# Patient Record
Sex: FEMALE | Race: WHITE | NOT HISPANIC OR LATINO | Employment: OTHER | URBAN - METROPOLITAN AREA
[De-identification: names, ages, dates, MRNs, and addresses within clinical notes are randomized per-mention and may not be internally consistent; named-entity substitution may affect disease eponyms.]

---

## 2018-09-27 ENCOUNTER — HISTORICAL (OUTPATIENT)
Dept: ADMINISTRATIVE | Facility: HOSPITAL | Age: 73
End: 2018-09-27

## 2019-01-30 ENCOUNTER — HISTORICAL (OUTPATIENT)
Dept: ADMINISTRATIVE | Facility: HOSPITAL | Age: 74
End: 2019-01-30

## 2019-01-30 LAB
ABS NEUT (OLG): 5.8 X10(3)/MCL (ref 2.1–9.2)
ALBUMIN SERPL-MCNC: 4.8 GM/DL (ref 3.4–5)
ALBUMIN/GLOB SERPL: 1.66 {RATIO} (ref 1.5–2.5)
ALP SERPL-CCNC: 87 UNIT/L (ref 38–126)
ALT SERPL-CCNC: 22 UNIT/L (ref 7–52)
AST SERPL-CCNC: 26 UNIT/L (ref 15–37)
BILIRUB SERPL-MCNC: 0.5 MG/DL (ref 0.2–1)
BILIRUBIN DIRECT+TOT PNL SERPL-MCNC: 0.1 MG/DL (ref 0–0.5)
BILIRUBIN DIRECT+TOT PNL SERPL-MCNC: 0.4 MG/DL
BUN SERPL-MCNC: 12 MG/DL (ref 7–18)
CALCIUM SERPL-MCNC: 9.7 MG/DL (ref 8.5–10)
CHLORIDE SERPL-SCNC: 102 MMOL/L (ref 98–107)
CHOLEST SERPL-MCNC: 249 MG/DL (ref 0–200)
CHOLEST/HDLC SERPL: 4.2 {RATIO}
CO2 SERPL-SCNC: 30 MMOL/L (ref 21–32)
CREAT SERPL-MCNC: 0.72 MG/DL (ref 0.6–1.3)
ERYTHROCYTE [DISTWIDTH] IN BLOOD BY AUTOMATED COUNT: 12.9 % (ref 11.5–17)
GLOBULIN SER-MCNC: 2.9 GM/DL (ref 1.2–3)
GLUCOSE SERPL-MCNC: 113 MG/DL (ref 74–106)
HCT VFR BLD AUTO: 45.3 % (ref 37–47)
HDLC SERPL-MCNC: 60 MG/DL (ref 35–60)
HGB BLD-MCNC: 14.6 GM/DL (ref 12–16)
LDLC SERPL CALC-MCNC: 150 MG/DL (ref 0–129)
LYMPHOCYTES # BLD AUTO: 1.8 X10(3)/MCL (ref 0.6–3.4)
LYMPHOCYTES NFR BLD AUTO: 21.6 % (ref 13–40)
MCH RBC QN AUTO: 30.1 PG (ref 27–31.2)
MCHC RBC AUTO-ENTMCNC: 32 GM/DL (ref 32–36)
MCV RBC AUTO: 93 FL (ref 80–94)
MONOCYTES # BLD AUTO: 0.7 X10(3)/MCL (ref 0.1–1.3)
MONOCYTES NFR BLD AUTO: 7.9 % (ref 0.1–24)
NEUTROPHILS NFR BLD AUTO: 70.5 % (ref 47–80)
PLATELET # BLD AUTO: 284 X10(3)/MCL (ref 130–400)
PMV BLD AUTO: 11 FL (ref 9.4–12.4)
POTASSIUM SERPL-SCNC: 4 MMOL/L (ref 3.5–5.1)
PROT SERPL-MCNC: 7.7 GM/DL (ref 6.4–8.2)
RBC # BLD AUTO: 4.85 X10(6)/MCL (ref 4.2–5.4)
SODIUM SERPL-SCNC: 139 MMOL/L (ref 136–145)
TRIGL SERPL-MCNC: 152 MG/DL (ref 30–150)
TSH SERPL-ACNC: 1.17 MIU/ML (ref 0.35–4.94)
VLDLC SERPL CALC-MCNC: 30.4 MG/DL
WBC # SPEC AUTO: 8.3 X10(3)/MCL (ref 4.5–11.5)

## 2019-04-15 ENCOUNTER — HISTORICAL (OUTPATIENT)
Dept: ADMINISTRATIVE | Facility: HOSPITAL | Age: 74
End: 2019-04-15

## 2019-04-15 LAB
ABS NEUT (OLG): 4.4 X10(3)/MCL (ref 2.1–9.2)
ALBUMIN SERPL-MCNC: 4.6 GM/DL (ref 3.4–5)
ALBUMIN/GLOB SERPL: 1.7 {RATIO} (ref 1.5–2.5)
ALP SERPL-CCNC: 78 UNIT/L (ref 38–126)
ALT SERPL-CCNC: 14 UNIT/L (ref 7–52)
AST SERPL-CCNC: 20 UNIT/L (ref 15–37)
BILIRUB SERPL-MCNC: 0.5 MG/DL (ref 0.2–1)
BILIRUBIN DIRECT+TOT PNL SERPL-MCNC: 0.1 MG/DL (ref 0–0.5)
BILIRUBIN DIRECT+TOT PNL SERPL-MCNC: 0.4 MG/DL
BUN SERPL-MCNC: 10 MG/DL (ref 7–18)
CALCIUM SERPL-MCNC: 9.3 MG/DL (ref 8.5–10)
CHLORIDE SERPL-SCNC: 100 MMOL/L (ref 98–107)
CHOLEST SERPL-MCNC: 224 MG/DL (ref 0–200)
CHOLEST/HDLC SERPL: 4.8 {RATIO}
CO2 SERPL-SCNC: 30 MMOL/L (ref 21–32)
CREAT SERPL-MCNC: 0.82 MG/DL (ref 0.6–1.3)
ERYTHROCYTE [DISTWIDTH] IN BLOOD BY AUTOMATED COUNT: 12.8 % (ref 11.5–17)
GLOBULIN SER-MCNC: 2.7 GM/DL (ref 1.2–3)
GLUCOSE SERPL-MCNC: 105 MG/DL (ref 74–106)
HCT VFR BLD AUTO: 41.4 % (ref 37–47)
HDLC SERPL-MCNC: 47 MG/DL (ref 35–60)
HGB BLD-MCNC: 14 GM/DL (ref 12–16)
LDLC SERPL CALC-MCNC: 117 MG/DL (ref 0–129)
LYMPHOCYTES # BLD AUTO: 1.8 X10(3)/MCL (ref 0.6–3.4)
LYMPHOCYTES NFR BLD AUTO: 27.6 % (ref 13–40)
MCH RBC QN AUTO: 30.2 PG (ref 27–31.2)
MCHC RBC AUTO-ENTMCNC: 34 GM/DL (ref 32–36)
MCV RBC AUTO: 89 FL (ref 80–94)
MONOCYTES # BLD AUTO: 0.4 X10(3)/MCL (ref 0.1–1.3)
MONOCYTES NFR BLD AUTO: 6.6 % (ref 0.1–24)
NEUTROPHILS NFR BLD AUTO: 65.8 % (ref 47–80)
PLATELET # BLD AUTO: 259 X10(3)/MCL (ref 130–400)
PMV BLD AUTO: 10.9 FL (ref 9.4–12.4)
POTASSIUM SERPL-SCNC: 4.2 MMOL/L (ref 3.5–5.1)
PROT SERPL-MCNC: 7.3 GM/DL (ref 6.4–8.2)
RBC # BLD AUTO: 4.64 X10(6)/MCL (ref 4.2–5.4)
SODIUM SERPL-SCNC: 137 MMOL/L (ref 136–145)
TRIGL SERPL-MCNC: 279 MG/DL (ref 30–150)
VLDLC SERPL CALC-MCNC: 55.8 MG/DL
WBC # SPEC AUTO: 6.6 X10(3)/MCL (ref 4.5–11.5)

## 2019-05-01 ENCOUNTER — HISTORICAL (OUTPATIENT)
Dept: ADMINISTRATIVE | Facility: HOSPITAL | Age: 74
End: 2019-05-01

## 2019-06-27 ENCOUNTER — HISTORICAL (OUTPATIENT)
Dept: LAB | Facility: HOSPITAL | Age: 74
End: 2019-06-27

## 2019-06-27 ENCOUNTER — HISTORICAL (OUTPATIENT)
Dept: ADMINISTRATIVE | Facility: HOSPITAL | Age: 74
End: 2019-06-27

## 2019-06-27 LAB
ABS NEUT (OLG): 4.5 X10(3)/MCL (ref 2.1–9.2)
ERYTHROCYTE [DISTWIDTH] IN BLOOD BY AUTOMATED COUNT: 12.9 % (ref 11.5–17)
FERRITIN SERPL-MCNC: 18.6 NG/ML (ref 8–388)
HCT VFR BLD AUTO: 42.2 % (ref 37–47)
HGB BLD-MCNC: 14.2 GM/DL (ref 12–16)
IRON SATN MFR SERPL: 24.4 % (ref 20–50)
IRON SERPL-MCNC: 109 MCG/DL (ref 50–175)
LYMPHOCYTES # BLD AUTO: 2 X10(3)/MCL (ref 0.6–3.4)
LYMPHOCYTES NFR BLD AUTO: 28.9 % (ref 13–40)
MCH RBC QN AUTO: 29.9 PG (ref 27–31.2)
MCHC RBC AUTO-ENTMCNC: 34 GM/DL (ref 32–36)
MCV RBC AUTO: 89 FL (ref 80–94)
MONOCYTES # BLD AUTO: 0.5 X10(3)/MCL (ref 0.1–1.3)
MONOCYTES NFR BLD AUTO: 6.6 % (ref 0.1–24)
NEUTROPHILS NFR BLD AUTO: 64.5 % (ref 47–80)
PLATELET # BLD AUTO: 259 X10(3)/MCL (ref 130–400)
PMV BLD AUTO: 11 FL (ref 9.4–12.4)
RBC # BLD AUTO: 4.75 X10(6)/MCL (ref 4.2–5.4)
TIBC SERPL-MCNC: 447 MCG/DL (ref 250–450)
TRANSFERRIN SERPL-MCNC: 353 MG/DL (ref 200–360)
WBC # SPEC AUTO: 7 X10(3)/MCL (ref 4.5–11.5)

## 2019-10-22 ENCOUNTER — HISTORICAL (OUTPATIENT)
Dept: LAB | Facility: HOSPITAL | Age: 74
End: 2019-10-22

## 2019-10-22 LAB
FERRITIN SERPL-MCNC: 36.5 NG/ML (ref 8–388)
IRON SATN MFR SERPL: 23.8 % (ref 20–50)
IRON SERPL-MCNC: 95 MCG/DL (ref 50–175)
TIBC SERPL-MCNC: 400 MCG/DL (ref 250–450)
TRANSFERRIN SERPL-MCNC: 308 MG/DL (ref 200–360)

## 2019-10-30 ENCOUNTER — HISTORICAL (OUTPATIENT)
Dept: ADMINISTRATIVE | Facility: HOSPITAL | Age: 74
End: 2019-10-30

## 2019-10-30 LAB
ALBUMIN SERPL-MCNC: 4.5 GM/DL (ref 3.4–5)
ALBUMIN/GLOB SERPL: 1.8 {RATIO} (ref 1.5–2.5)
ALP SERPL-CCNC: 96 UNIT/L (ref 38–126)
ALT SERPL-CCNC: 16 UNIT/L (ref 7–52)
AST SERPL-CCNC: 20 UNIT/L (ref 15–37)
BILIRUB SERPL-MCNC: 0.5 MG/DL (ref 0.2–1)
BILIRUBIN DIRECT+TOT PNL SERPL-MCNC: 0.1 MG/DL (ref 0–0.5)
BILIRUBIN DIRECT+TOT PNL SERPL-MCNC: 0.4 MG/DL
BUN SERPL-MCNC: 10 MG/DL (ref 7–18)
CALCIUM SERPL-MCNC: 9.6 MG/DL (ref 8.5–10)
CHLORIDE SERPL-SCNC: 100 MMOL/L (ref 98–107)
CHOLEST SERPL-MCNC: 236 MG/DL (ref 0–200)
CHOLEST/HDLC SERPL: 4.7 {RATIO}
CO2 SERPL-SCNC: 32 MMOL/L (ref 21–32)
CREAT SERPL-MCNC: 0.79 MG/DL (ref 0.6–1.3)
GLOBULIN SER-MCNC: 2.5 GM/DL (ref 1.2–3)
GLUCOSE SERPL-MCNC: 102 MG/DL (ref 74–106)
HDLC SERPL-MCNC: 50 MG/DL (ref 35–60)
LDLC SERPL CALC-MCNC: 145 MG/DL (ref 0–129)
POTASSIUM SERPL-SCNC: 4.3 MMOL/L (ref 3.5–5.1)
PROT SERPL-MCNC: 7 GM/DL (ref 6.4–8.2)
SODIUM SERPL-SCNC: 139 MMOL/L (ref 136–145)
TRIGL SERPL-MCNC: 171 MG/DL (ref 30–150)
VLDLC SERPL CALC-MCNC: 34.2 MG/DL

## 2020-07-14 ENCOUNTER — HISTORICAL (OUTPATIENT)
Dept: ADMINISTRATIVE | Facility: HOSPITAL | Age: 75
End: 2020-07-14

## 2020-07-14 LAB
ABS NEUT (OLG): 3.9 X10(3)/MCL (ref 2.1–9.2)
ALBUMIN SERPL-MCNC: 4.5 GM/DL (ref 3.4–5)
ALBUMIN/GLOB SERPL: 1.88 {RATIO} (ref 1.5–2.5)
ALP SERPL-CCNC: 88 UNIT/L (ref 38–126)
ALT SERPL-CCNC: 13 UNIT/L (ref 7–52)
AST SERPL-CCNC: 18 UNIT/L (ref 15–37)
BILIRUB SERPL-MCNC: 0.5 MG/DL (ref 0.2–1)
BILIRUBIN DIRECT+TOT PNL SERPL-MCNC: 0.1 MG/DL (ref 0–0.5)
BILIRUBIN DIRECT+TOT PNL SERPL-MCNC: 0.4 MG/DL
BUN SERPL-MCNC: 14 MG/DL (ref 7–18)
CALCIUM SERPL-MCNC: 9.7 MG/DL (ref 8.5–10)
CHLORIDE SERPL-SCNC: 100 MMOL/L (ref 98–107)
CHOLEST SERPL-MCNC: 230 MG/DL (ref 0–200)
CHOLEST/HDLC SERPL: 5.1 {RATIO}
CO2 SERPL-SCNC: 30 MMOL/L (ref 21–32)
CREAT SERPL-MCNC: 0.77 MG/DL (ref 0.6–1.3)
ERYTHROCYTE [DISTWIDTH] IN BLOOD BY AUTOMATED COUNT: 12.6 % (ref 11.5–17)
GLOBULIN SER-MCNC: 2.4 GM/DL (ref 1.2–3)
GLUCOSE SERPL-MCNC: 93 MG/DL (ref 74–106)
HCT VFR BLD AUTO: 42.5 % (ref 37–47)
HDLC SERPL-MCNC: 45 MG/DL (ref 35–60)
HGB BLD-MCNC: 13.7 GM/DL (ref 12–16)
LDLC SERPL CALC-MCNC: 142 MG/DL (ref 0–129)
LYMPHOCYTES # BLD AUTO: 1.7 X10(3)/MCL (ref 0.6–3.4)
LYMPHOCYTES NFR BLD AUTO: 28.6 % (ref 13–40)
MCH RBC QN AUTO: 29.1 PG (ref 27–31.2)
MCHC RBC AUTO-ENTMCNC: 32 GM/DL (ref 32–36)
MCV RBC AUTO: 90 FL (ref 80–94)
MONOCYTES # BLD AUTO: 0.5 X10(3)/MCL (ref 0.1–1.3)
MONOCYTES NFR BLD AUTO: 8.7 % (ref 0.1–24)
NEUTROPHILS NFR BLD AUTO: 62.7 % (ref 47–80)
PLATELET # BLD AUTO: 244 X10(3)/MCL (ref 130–400)
PMV BLD AUTO: 11 FL (ref 9.4–12.4)
POTASSIUM SERPL-SCNC: 4.1 MMOL/L (ref 3.5–5.1)
PROT SERPL-MCNC: 6.9 GM/DL (ref 6.4–8.2)
RBC # BLD AUTO: 4.7 X10(6)/MCL (ref 4.2–5.4)
SODIUM SERPL-SCNC: 139 MMOL/L (ref 136–145)
TRIGL SERPL-MCNC: 174 MG/DL (ref 30–150)
TSH SERPL-ACNC: 2.34 MIU/ML (ref 0.35–4.94)
VLDLC SERPL CALC-MCNC: 34.8 MG/DL
WBC # SPEC AUTO: 6.1 X10(3)/MCL (ref 4.5–11.5)

## 2021-01-14 ENCOUNTER — HISTORICAL (OUTPATIENT)
Dept: ADMINISTRATIVE | Facility: HOSPITAL | Age: 76
End: 2021-01-14

## 2021-01-14 LAB
ABS NEUT (OLG): 4.5 X10(3)/MCL (ref 2.1–9.2)
ALBUMIN SERPL-MCNC: 4.3 GM/DL (ref 3.4–5)
ALBUMIN/GLOB SERPL: 1.79 {RATIO} (ref 1.5–2.5)
ALP SERPL-CCNC: 94 UNIT/L (ref 38–126)
ALT SERPL-CCNC: 12 UNIT/L (ref 7–52)
AST SERPL-CCNC: 16 UNIT/L (ref 15–37)
BILIRUB SERPL-MCNC: 0.5 MG/DL (ref 0.2–1)
BILIRUBIN DIRECT+TOT PNL SERPL-MCNC: 0.1 MG/DL (ref 0–0.5)
BILIRUBIN DIRECT+TOT PNL SERPL-MCNC: 0.4 MG/DL
BUN SERPL-MCNC: 14 MG/DL (ref 7–18)
CALCIUM SERPL-MCNC: 9.7 MG/DL (ref 8.5–10)
CHLORIDE SERPL-SCNC: 99 MMOL/L (ref 98–107)
CHOLEST SERPL-MCNC: 239 MG/DL (ref 0–200)
CHOLEST/HDLC SERPL: 5.4 {RATIO}
CO2 SERPL-SCNC: 31 MMOL/L (ref 21–32)
CREAT SERPL-MCNC: 0.87 MG/DL (ref 0.6–1.3)
ERYTHROCYTE [DISTWIDTH] IN BLOOD BY AUTOMATED COUNT: 12.4 % (ref 11.5–17)
GLOBULIN SER-MCNC: 2.4 GM/DL (ref 1.2–3)
GLUCOSE SERPL-MCNC: 99 MG/DL (ref 74–106)
HCT VFR BLD AUTO: 42.2 % (ref 37–47)
HDLC SERPL-MCNC: 44 MG/DL (ref 35–60)
HGB BLD-MCNC: 13.9 GM/DL (ref 12–16)
LDLC SERPL CALC-MCNC: 165 MG/DL (ref 0–129)
LYMPHOCYTES # BLD AUTO: 1.8 X10(3)/MCL (ref 0.6–3.4)
LYMPHOCYTES NFR BLD AUTO: 26 % (ref 13–40)
MCH RBC QN AUTO: 29.8 PG (ref 27–31.2)
MCHC RBC AUTO-ENTMCNC: 33 GM/DL (ref 32–36)
MCV RBC AUTO: 91 FL (ref 80–94)
MONOCYTES # BLD AUTO: 0.5 X10(3)/MCL (ref 0.1–1.3)
MONOCYTES NFR BLD AUTO: 7.4 % (ref 0.1–24)
NEUTROPHILS NFR BLD AUTO: 66.6 % (ref 47–80)
PLATELET # BLD AUTO: 266 X10(3)/MCL (ref 130–400)
PMV BLD AUTO: 10.5 FL (ref 9.4–12.4)
POTASSIUM SERPL-SCNC: 3.9 MMOL/L (ref 3.5–5.1)
PROT SERPL-MCNC: 6.7 GM/DL (ref 6.4–8.2)
RBC # BLD AUTO: 4.66 X10(6)/MCL (ref 4.2–5.4)
SODIUM SERPL-SCNC: 138 MMOL/L (ref 136–145)
TRIGL SERPL-MCNC: 203 MG/DL (ref 30–150)
VLDLC SERPL CALC-MCNC: 40.6 MG/DL
WBC # SPEC AUTO: 6.8 X10(3)/MCL (ref 4.5–11.5)

## 2021-09-16 ENCOUNTER — HISTORICAL (OUTPATIENT)
Dept: ADMINISTRATIVE | Facility: HOSPITAL | Age: 76
End: 2021-09-16

## 2021-09-16 LAB
ABS NEUT (OLG): 5.5 X10(3)/MCL (ref 2.1–9.2)
ALBUMIN SERPL-MCNC: 4.6 GM/DL (ref 3.4–5)
ALBUMIN/GLOB SERPL: 1.84 {RATIO} (ref 1.5–2.5)
ALP SERPL-CCNC: 83 UNIT/L (ref 38–126)
ALT SERPL-CCNC: 16 UNIT/L (ref 7–52)
AST SERPL-CCNC: 21 UNIT/L (ref 15–37)
BILIRUB SERPL-MCNC: 0.5 MG/DL (ref 0.2–1)
BILIRUBIN DIRECT+TOT PNL SERPL-MCNC: 0.1 MG/DL (ref 0–0.5)
BILIRUBIN DIRECT+TOT PNL SERPL-MCNC: 0.4 MG/DL
BUN SERPL-MCNC: 14 MG/DL (ref 7–18)
CALCIUM SERPL-MCNC: 9.8 MG/DL (ref 8.5–10)
CHLORIDE SERPL-SCNC: 98 MMOL/L (ref 98–107)
CHOLEST SERPL-MCNC: 233 MG/DL (ref 0–200)
CHOLEST/HDLC SERPL: 5 {RATIO}
CO2 SERPL-SCNC: 29 MMOL/L (ref 21–32)
CREAT SERPL-MCNC: 0.81 MG/DL (ref 0.6–1.3)
DEPRECATED CALCIDIOL+CALCIFEROL SERPL-MC: 32 NG/ML (ref 30–80)
ERYTHROCYTE [DISTWIDTH] IN BLOOD BY AUTOMATED COUNT: 12.5 % (ref 11.5–17)
EST CREAT CLEARANCE SER (OHS): 66.69 ML/MIN
GLOBULIN SER-MCNC: 2.5 GM/DL (ref 1.2–3)
GLUCOSE SERPL-MCNC: 93 MG/DL (ref 74–106)
HCT VFR BLD AUTO: 44.8 % (ref 37–47)
HDLC SERPL-MCNC: 47 MG/DL (ref 35–60)
HGB BLD-MCNC: 14.2 GM/DL (ref 12–16)
LDLC SERPL CALC-MCNC: 141 MG/DL (ref 0–129)
LYMPHOCYTES # BLD AUTO: 2.1 X10(3)/MCL (ref 0.6–3.4)
LYMPHOCYTES NFR BLD AUTO: 26 % (ref 13–40)
MCH RBC QN AUTO: 29.3 PG (ref 27–31.2)
MCHC RBC AUTO-ENTMCNC: 32 GM/DL (ref 32–36)
MCV RBC AUTO: 93 FL (ref 80–94)
MONOCYTES # BLD AUTO: 0.5 X10(3)/MCL (ref 0.1–1.3)
MONOCYTES NFR BLD AUTO: 6.1 % (ref 0.1–24)
NEUTROPHILS NFR BLD AUTO: 67.9 % (ref 47–80)
PLATELET # BLD AUTO: 287 X10(3)/MCL (ref 130–400)
PMV BLD AUTO: 11 FL (ref 9.4–12.4)
POTASSIUM SERPL-SCNC: 4.3 MMOL/L (ref 3.5–5.1)
PROT SERPL-MCNC: 7.1 GM/DL (ref 6.4–8.2)
RBC # BLD AUTO: 4.84 X10(6)/MCL (ref 4.2–5.4)
SODIUM SERPL-SCNC: 138 MMOL/L (ref 136–145)
TRIGL SERPL-MCNC: 175 MG/DL (ref 30–150)
TSH SERPL-ACNC: 2.24 MIU/ML (ref 0.35–4.94)
VLDLC SERPL CALC-MCNC: 35 MG/DL
WBC # SPEC AUTO: 8.1 X10(3)/MCL (ref 4.5–11.5)

## 2022-04-10 ENCOUNTER — HISTORICAL (OUTPATIENT)
Dept: ADMINISTRATIVE | Facility: HOSPITAL | Age: 77
End: 2022-04-10

## 2022-04-26 VITALS
SYSTOLIC BLOOD PRESSURE: 142 MMHG | WEIGHT: 157.63 LBS | HEIGHT: 59 IN | DIASTOLIC BLOOD PRESSURE: 72 MMHG | BODY MASS INDEX: 31.78 KG/M2

## 2022-05-02 NOTE — HISTORICAL [facility] CERNER
This is a historical note converted from Toshia. Formatting and pictures may have been removed.  Please reference Toshia for original formatting and attached multimedia. Chief Complaint  WELLNESS CPX FAST, REFILL MEDS, DISCUSS ISSUES WITH , LEFT COLLAR BONE MOVES AROUND AND PAINFUL WITH CERTAIN MOVEMENTS  History of Present Illness  Patient presents today for annual wellness exam.? She has a few complaints including #1?pain to her left shoulder she states that it is right at the end of the collarbone right in the joint space, she is?doing more activity?and was pushing down to get up out of the chair when she noticed?kind of a pull or strain in that area?and has been bothering her off and on since this is been about 3 weeks,?she has been taking over-the-counter pain relievers?as needed but nothing consistently she says it helps temporarily.? #2 she states that?she may be getting depressed that she becomes tearful she is concerned that her 2 daughters do not talk to each other and seem to have had a falling out, this coupled with the fact that her? is beginning to show signs of dementia?we try to get him to see a neurologist?sometime ago but he refused at this time although he is on Aricept?which she said did help a little?but she says now she thinks she can convince him?to see a specialist. ?She can imagine this seems to?cause her much distress that she does not know?what can happen further on down the road as the age, and his anger?and attitude toward her she feels that has changed?as his dementia has worsened.  Review of Systems  Constitutional:?No fever, no weakness, no weight loss, no fatigue  Eye: ? ? ? ? ? ? ? ???No eye redness, drainage, or pain  ENMT:??? ? ? ? ? ? No sore?throat pain, postnasal drainage, ?or mucus production  Respiratory:????No wheezing,?no shortness of breath  Cardiovascular:??No chest pain, no VELA  Gastrointestinal:?No nausea, vomiting, or diarrhea. No abdominal pain, no  hematochezia or melena  Musculoskeletal:?No muscle or joint pain, no joint swelling  Integumentary:??? No skin rash or abnormal lesion  Neuro:??No headaches, dizziness, or weakness  Psych:?No anxiety, depression, or SI/HI  Endo:??No polyuria, polydipsia, or polyphagia  Heme/Lymph:??No bruising or lymphadenopathy  Physical Exam  Vitals & Measurements  T:?36.9? ?C (Oral)? HR:?92(Peripheral)? BP:?139/86?  HT:?154?cm? WT:?68.2?kg? BMI:?28.76?  General: ???? ? ? ? ? Well developed, well-nourished, in no acute distress  Eye: ? ? ? ? ? ? ? ? ? ??Clear conjunctiva, eyelids normal  HENT:??? ? ? ? ? ? ? ? No erythema, No exudate or swelling, TMs clear  Neck:??? ? ? ? ? ? ? ? ?Full range of motion, No cervical lymphadenopathy  Respiratory:??? ? ?Clear to auscultation bilaterally  Cardiovascular:?Regular rate and rhythm without murmurs, gallops or rubs  Abdomen: ? ? ? ? ?Soft, NT, ND, NABS x4, no HSM  Musculoskeletal:?No tenderness, joints WNL, FROM, neg SLR, no CCE  Neuro: ? ? ? ? ? ? ? ? ?No motor/sensory deficits, Reflexes 2+ throughout, CN II-XII intact  Integumentary: ??No rashes or skin lesions present  LN:?WNL  Assessment/Plan  1.?Medicare annual wellness visit, subsequent?Z00.00  ?Normal Exam  Ordered:  Automated Diff, Routine collect, 01/30/19 15:13:00 CST, Blood, Collected, Stop date 01/30/19 15:13:00 CST, Lab Collect, Medicare annual wellness visit, subsequent, 01/30/19 15:13:00 CST  CBC w/ Auto Diff, Routine collect, 01/30/19 15:13:00 CST, Blood, Stop date 01/30/19 15:13:00 CST, Lab Collect, Medicare annual wellness visit, subsequent, 01/30/19 15:13:00 CST  Comprehensive Metabolic Panel, Routine collect, 01/30/19 15:13:00 CST, Blood, Stop date 01/30/19 15:13:00 CST, Lab Collect, Medicare annual wellness visit, subsequent  Hyperlipemia, 01/30/19 15:13:00 CST  Lipid Panel, Now collect, 01/30/19 15:13:00 CST, Blood, Stop date 01/30/19 15:13:00 CST, Lab Collect, Medicare annual wellness visit, subsequent  Benign  essential HTN  Hyperlipemia, 01/30/19 15:13:00 CST  Preventative Health Care Est 65 yrs and over [address]896 PC, Medicare annual wellness visit, subsequent, [facility]LINK AMB - AFP, 01/30/19 14:40:00 CST  Thyroid Stimulating Hormone, Routine collect, 01/30/19 15:13:00 CST, Blood, Stop date 01/30/19 15:13:00 CST, Lab Collect, Medicare annual wellness visit, subsequent, 01/30/19 15:13:00 CST  ?  2.?Benign essential HTN?I10  ?stable  Ordered:  Clinic Follow up, *Est. 07/31/19 13:30:00 CDT, Order for future visit, Sprain of acromioclavicular joint, HLink AFP  Lipid Panel, Now collect, 01/30/19 15:13:00 CST, Blood, Stop date 01/30/19 15:13:00 CST, Lab Collect, Medicare annual wellness visit, subsequent  Benign essential HTN  Hyperlipemia, 01/30/19 15:13:00 CST  Office/Outpatient Visit Level 4 Established 96482 PC, Benign essential HTN  Osteoarthritis  Hyperlipemia  Sprain of acromioclavicular joint, [facility]LINK AMB - AFP, 01/30/19 14:40:00 CST  ?  3.?Osteoarthritis?M19.90  ?stable  Ordered:  Clinic Follow up, *Est. 07/31/19 13:30:00 CDT, Order for future visit, Sprain of acromioclavicular joint, HLink AFP  Office/Outpatient Visit Level 4 Established 80586 PC, Benign essential HTN  Osteoarthritis  Hyperlipemia  Sprain of acromioclavicular joint, [facility]LINK AMB - AFP, 01/30/19 14:40:00 CST  ?  4.?Hyperlipemia?E78.5  ?repeat labs today  Ordered:  Clinic Follow up, *Est. 07/31/19 13:30:00 CDT, Order for future visit, Sprain of acromioclavicular joint, HLink AFP  Comprehensive Metabolic Panel, Routine collect, 01/30/19 15:13:00 CST, Blood, Stop date 01/30/19 15:13:00 CST, Lab Collect, Medicare annual wellness visit, subsequent  Hyperlipemia, 01/30/19 15:13:00 CST  Lipid Panel, Now collect, 01/30/19 15:13:00 CST, Blood, Stop date 01/30/19 15:13:00 CST, Lab Collect, Medicare annual wellness visit, subsequent  Benign essential HTN  Hyperlipemia, 01/30/19 15:13:00 CST  Office/Outpatient Visit Level 4 Established 66286 PC, Benign essential HTN   Osteoarthritis  Hyperlipemia  Sprain of acromioclavicular joint, [facility]LINK AMB - AFP, 01/30/19 14:40:00 CST  ?  5.?Sprain of acromioclavicular joint?S43.50XA  ?NSAIDS, OTC creams  Ordered:  Clinic Follow up, *Est. 07/31/19 13:30:00 CDT, Order for future visit, Sprain of acromioclavicular joint, HLink AFP  Office/Outpatient Visit Level 4 Established 93898 PC, Benign essential HTN  Osteoarthritis  Hyperlipemia  Sprain of acromioclavicular joint, [facility] AMB - AFP, 01/30/19 14:40:00 CST  ?  6.?Depression?F32.9  ?Discussed risks versus benefits of SSRIs possible side effects.  We also talked about whether or not she feels safe with her ?although he becomes angered sometimes but then apologizes?due to his dementia.? She does state?that she think she will be okay, and we will continue to Take care of him?but is aware?of her need?to remain safe in the home environment?if something does happen or any changes occur as his condition worsens.  ?  Orders:  citalopram, 10 mg = 1 tab(s), Oral, Daily, # 30 tab(s), 1 Refill(s), Pharmacy: [facility] [id number] [facility]   Problem List/Past Medical History  Ongoing  Arthritis  Benign essential HTN  Bulging disc  Cataracts  Chronic back pain  Depression  Hyperlipemia  Insomnia  Medicare annual wellness visit, subsequent  Osteoarthritis  Osteoporosis  Presence of pessary  Rib fracture  Scoliosis  Sprain of acromioclavicular joint  Wears glasses  Historical  No qualifying data  Procedure/Surgical History  [facility] - Extracapsular cataract extraction (07/15/2013)  flat foot correction (1958)  tonsillectomy (1958)  back surgery  bilateral tubal ligation   Medications  amitriptyline 50 mg oral tablet, See Instructions, 1 refills  aspirin 81 mg oral tablet, CHEWABLE, Oral, Daily  Celexa 10 mg oral tablet, 10 mg= 1 tab(s), Oral, Daily, 1 refills  gabapentin 300 mg oral capsule, 300 mg= 1 cap(s), Oral, TID, 3 refills  hydrochlorothiazide 25 mg oral tablet, See Instructions, 3 refills  Emre Red  Omega-3 Krill Oil, 300 mg, Oral, Daily  meloxicam 15 mg oral tablet, See Instructions, 1 refills  Ocuvite oral tablet, Oral, Daily  traMADol 50 mg oral tablet, 50 mg= 1 tab(s), Oral, q6hr, PRN, 1 refills  Xyzal 5 mg oral tablet, 5 mg= 1 tab(s), Oral, qPM  Zaditor 0.025% ophthalmic solution, Oral, BID, PRN  Allergies  No Known Allergies  Social History  Alcohol - Denies Alcohol Use, 07/11/2013  Past, 03/09/2018  Employment/School  Retired, Highest education level: High school., 07/11/2013  Home/Environment  Lives with Spouse., 07/11/2013  Nutrition/Health  Regular, 07/21/2013  Substance Abuse - Denies Substance Abuse, 07/21/2013  Never, 01/30/2019  Tobacco - Denies Tobacco Use, 07/11/2013  Never (less than 100 in lifetime), N/A, 01/30/2019  Family History  CAD - Coronary artery disease: Sister.  Cancer: Mother and Father.  Rheumatoid arthritis: Brother.  Stroke: Brother.  Immunizations  Vaccine Date Status   pneumococcal 23-polyvalent vaccine 09/27/2018 Given   influenza virus vaccine, inactivated 09/05/2018 Recorded   pneumococcal 13-valent conjugate vaccine 03/29/2018 Recorded   influenza virus vaccine, inactivated 08/11/2017 Recorded   Health Maintenance  Health Maintenance  ???Pending?(in the next year)  ??? ??OverDue  ??? ? ? ?Pneumococcal Vaccine due??and every?  ??? ? ? ?Hypertension Management-BMP due??07/08/14??and every 1??year(s)  ??? ? ? ?Aspirin Therapy for CVD Prevention due??07/11/14??and every 1??year(s)  ??? ? ? ?Advance Directive due??07/21/14??and every 1??year(s)  ??? ? ? ?Smoking Cessation due??07/29/14??and every 1??year(s)  ??? ??Due?  ??? ? ? ?ADL Screening due??01/30/19??and every 1??year(s)  ??? ? ? ?Alcohol Misuse Screening due??01/30/19??and every 1??year(s)  ??? ? ? ?Breast Cancer Screening (Senior Wellness) due??01/30/19??and every?  ??? ? ? ?Cognitive Screening due??01/30/19??and every 1??year(s)  ??? ? ? ?Fall Risk Assessment due??01/30/19??and every 1??year(s)  ??? ? ? ?Functional  Assessment due??01/30/19??and every 1??year(s)  ??? ? ? ?Geriatric Depression Screening due??01/30/19??and every 1??year(s)  ??? ? ? ?Hypertension Management-Education due??01/30/19??and every 1??year(s)  ??? ? ? ?Tetanus Vaccine due??01/30/19??and every 10??year(s)  ??? ? ? ?Zoster Vaccine due??01/30/19??and every 100??year(s)  ???Satisfied?(in the past 1 year)  ??? ??Satisfied?  ??? ? ? ?Blood Pressure Screening on??01/30/19.??Satisfied by Viviana Lee LPN  ??? ? ? ?Body Mass Index Check on??01/30/19.??Satisfied by Viviana Lee LPN  ??? ? ? ?Depression Screening on??01/30/19.??Satisfied by Viviana Lee LPN  ??? ? ? ?Hypertension Management-Blood Pressure on??01/30/19.??Satisfied by Viviana Lee LPN  ??? ? ? ?Influenza Vaccine on??09/05/18.??Satisfied by Viviana Lee LPN  ??? ? ? ?Obesity Screening on??01/30/19.??Satisfied by Viviana Lee LPN  ??? ? ? ?Pneumococcal Vaccine on??09/27/18.??Satisfied by Viviana Lee LPN  ?  ?

## 2022-05-02 NOTE — HISTORICAL [facility] CERNER
This is a historical note converted from Toshia. Formatting and pictures may have been removed.  Please reference Toshia for original formatting and attached multimedia. Chief Complaint  6 MTH RC HTN, RC CHRONIC BACK PAIN, OA ?FAST  History of Present Illness  Follow up for HTN,? OA.? Pt doing well;  Pt is concerned about her memory; States that she is not as sharp as in the past.?  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  Independent in ADLs and IADLs.? Drives without difficulty; pays bills, sets up weekly meds for herself and her ;  ?  Has 3 children ([place], [place])  Review of Systems  Constitutional:?no fever, no fatigue, no weakness  Psych: no anxiety,?no?depression, no insomnia  Respiratory:?no cough, no wheezing, no shortness of breath  Cardiovascular:?no chest pain, no palpitations, no edema  Gastrointestinal:?no abdominal pain, no nausea, vomiting, diarrhea or constipation  Hema/Lymph:?no abnormal bruising or bleeding  Endocrine:?no heat or cold intolerance, no excessive thirst or excessive urination  Integumentary:?no skin rash or abnormal lesion  Neurologic: no headache, no dizziness, no weakness or numbness  Physical Exam  Vitals & Measurements  T:?36.7? ?C (Oral)? HR:?64(Peripheral)? BP:?124/72?  HT:?149.00?cm? HT:?149?cm? WT:?70.100?kg? WT:?70.1?kg? BMI:?31.58?  General:?well-developed well-nourished in no acute distress  Neck: no thyromegaly, no?lymphadenopathy, no carotid bruits  Respiratory:?respirations even and unlabored, clear to auscultation bilaterally  Cardiovascular:?regular rate and rhythm without murmurs, gallops or rubs  Musculoskeletal:?full range of motion of all extremities/spine  Integumentary: no rashes or skin lesions present  Neurologic: grossly intact  Assessment/Plan  1.?HTN (hypertension)?I10  ?Continue HCTZ 25mg/day  CMP today  Ordered:  Clinic Follow up, *Est. 07/14/21 10:00:00 CDT, Order for future visit, HLD (hyperlipidemia), HLink AFP  Comprehensive  Metabolic Panel, Routine collect, 01/14/21 10:53:00 CST, Blood, Stop date 01/14/21 10:53:00 CST, Lab Collect, HTN (hypertension), 01/14/21 10:53:00 CST  Office/Outpatient Visit Level 3 Established 11827 PC, HTN (hypertension)  HLD (hyperlipidemia)  Osteoarthritis, HLINK AMB - AFP, 01/14/21 11:10:00 CST  ?  2.?Osteoarthritis?M19.90  ?Continue tramadol, meloxicam  Ordered:  Clinic Follow up, *Est. 07/14/21 10:00:00 CDT, Order for future visit, HLD (hyperlipidemia), HLink AFP  Office/Outpatient Visit Level 3 Established 75075 PC, HTN (hypertension)  HLD (hyperlipidemia)  Osteoarthritis, [facility]LINK AMB - AFP, 01/14/21 11:10:00 CST  ?  3.?Hyperlipemia?E78.5  ?diet controlled; FLP today  Ordered:  Clinic Follow up, *Est. 07/14/21 10:00:00 CDT, Order for future visit, HLD (hyperlipidemia), HLink AFP  Lipid Panel, Routine collect, 01/14/21 10:53:00 CST, Blood, Stop date 01/14/21 10:53:00 CST, Lab Collect, HLD (hyperlipidemia), 01/14/21 10:53:00 CST  Office/Outpatient Visit Level 3 Established 77361 PC, HTN (hypertension)  HLD (hyperlipidemia)  Osteoarthritis, [facility]LINK AMB - AFP, 01/14/21 11:10:00 CST  ?  Follow up in 6 months for annual wellness  Referrals  Clinic Follow up, *Est. 07/14/21 10:00:00 CDT, Order for future visit, HLD (hyperlipidemia), HLink AFP   Problem List/Past Medical History  Ongoing  Anxiety  Arthritis  Benign essential HTN  Bulging disc  Chronic back pain  Depression  GERD (gastroesophageal reflux disease)  Hyperlipemia  Insomnia  Medicare annual wellness visit, subsequent  Obesity  BARB on CPAP  Osteoarthritis  Osteoporosis  Presence of pessary  Rib fracture  Scoliosis  Sprain of acromioclavicular joint  Wears glasses  Historical  Cataracts  Procedure/Surgical History  Discission of secondary membrane [after cataract] (01/07/2015)  Discission of secondary membranous cataract (opacified posterior lens capsule and/or anterior hyaloid); laser surgery (eg, YAG laser) (1 or more stages).  (01/07/2015)  Colonoscopy (09/04/2013)  Extracapsular cataract removal with insertion of intraocular lens prosthesis (1 stage procedure), manual or mechanical technique (eg, irrigation and aspiration or phacoemulsification). (07/29/2013)  Insertion of intraocular lens prosthesis at time of cataract extraction, one-stage (07/29/2013)  Phacoemulsification and aspiration of cataract (07/29/2013)  [facility] - Extracapsular cataract extraction (07/15/2013)  Extracapsular cataract removal with insertion of intraocular lens prosthesis (1 stage procedure), manual or mechanical technique (eg, irrigation and aspiration or phacoemulsification). (07/15/2013)  Insertion of intraocular lens prosthesis at time of cataract extraction, one-stage (07/15/2013)  Phacoemulsification and aspiration of cataract (07/15/2013)  flat foot correction (1958)  tonsillectomy (1958)  back surgery  bilateral tubal ligation   Medications  amitriptyline 50 mg oral tablet, See Instructions  aspirin 81 mg oral tablet, CHEWABLE, 81 mg= 1 tab(s), Oral, Daily, 11 refills  citalopram 20 mg oral tablet, See Instructions  fluticasone 50 mcg/inh nasal spray, 1 spray(s), Both Nostrils, Daily  gabapentin 300 mg oral capsule, See Instructions  hydrochlorothiazide 25 mg oral tablet, See Instructions  Emre Red Omega-3 Krill Oil, 300 mg, Oral, Daily  meloxicam 15 mg oral tablet, See Instructions  Ocuvite oral tablet, Oral, Daily  omeprazole 40 mg oral DR capsule, 40 mg= 1 cap(s), Oral, Daily  Slow Release Iron 160 mg oral tablet, extended release, 160 mg= 1 tab(s), Oral, Daily  traMADol 50 mg oral tablet, 50 mg= 1 tab(s), Oral, q4hr, PRN, 1 refills  Xyzal 5 mg oral tablet, 5 mg= 1 tab(s), Oral, qPM  Zaditor 0.025% ophthalmic solution, Oral, BID, PRN  Allergies  No Known Allergies  Social History  Abuse/Neglect  No, 01/14/2021  Alcohol - Denies Alcohol Use, 07/11/2013  Past, 03/09/2018  Employment/School  Retired, Highest education level: High school.,  07/11/2013  Home/Environment  Lives with Spouse., 07/11/2013  Nutrition/Health  Regular, 07/21/2013  Substance Use - Denies Substance Abuse, 07/21/2013  Never, 01/30/2019  Tobacco - Denies Tobacco Use, 07/11/2013  Never (less than 100 in lifetime), N/A, 01/14/2021  Family History  CAD - Coronary artery disease: Sister.  Cancer: Mother and Father.  Rheumatoid arthritis: Brother.  Stroke: Brother.  Immunizations  Vaccine Date Status   influenza virus vaccine, inactivated 09/10/2020 Given   influenza virus vaccine, inactivated 09/19/2019 Recorded   pneumococcal 23-polyvalent vaccine 09/27/2018 Given   influenza virus vaccine, inactivated 09/05/2018 Recorded   pneumococcal 13-valent conjugate vaccine 03/29/2018 Recorded   influenza virus vaccine, inactivated 08/11/2017 Recorded   influenza virus vaccine, inactivated 09/30/2014 Recorded   influenza virus vaccine, inactivated 10/08/2010 Recorded   influenza virus vaccine, inactivated 11/21/2001 Recorded   Health Maintenance  Health Maintenance  ???Pending?(in the next year)  ??? ??OverDue  ??? ? ? ?Aspirin Therapy for CVD Prevention due??04/15/20??and every 1??year(s)  ??? ? ? ?Influenza Vaccine due??10/01/20??and every 1??day(s)  ??? ? ? ?Advance Directive due??01/02/21??and every 1??year(s)  ??? ? ? ?Cognitive Screening due??01/02/21??and every 1??year(s)  ??? ? ? ?Fall Risk Assessment due??01/02/21??and every 1??year(s)  ??? ? ? ?Functional Assessment due??01/02/21??and every 1??year(s)  ??? ??Due?  ??? ? ? ?Alcohol Misuse Screening due??01/02/21??and every 1??year(s)  ??? ? ? ?ADL Screening due??01/14/21??and every 1??year(s)  ??? ? ? ?Hypertension Management-Education due??01/14/21??and every 1??year(s)  ??? ? ? ?Tetanus Vaccine due??01/14/21??and every 10??year(s)  ??? ? ? ?Zoster Vaccine due??01/14/21??Unknown Frequency  ??? ??Due In Future?  ??? ? ? ?Hypertension Management-BMP not due until??07/14/21??and every 1??year(s)  ??? ? ? ?Medicare Annual Wellness Exam  not due until??07/14/21??and every 1??year(s)  ??? ? ? ?Obesity Screening not due until??01/01/22??and every 1??year(s)  ???Satisfied?(in the past 1 year)  ??? ??Satisfied?  ??? ? ? ?Blood Pressure Screening on??01/14/21.??Satisfied by Viviana Lee LPN  ??? ? ? ?Body Mass Index Check on??01/14/21.??Satisfied by Viviana Lee LPN  ??? ? ? ?Depression Screening on??01/14/21.??Satisfied by Viviana Lee LPN  ??? ? ? ?Diabetes Screening on??07/14/20.??Satisfied by Isrrael Navarro  ??? ? ? ?Hypertension Management-Blood Pressure on??01/14/21.??Satisfied by Viviana Lee LPN  ??? ? ? ?Hypertension Management-BMP on??07/14/20.??Satisfied by Isrrael Navarro  ??? ? ? ?Influenza Vaccine on??09/10/20.??Satisfied by Yoel RICARDO, YOLANDEP, Jesse  ??? ? ? ?Lipid Screening on??07/14/20.??Satisfied by Isrrael Navarro  ??? ? ? ?Medicare Annual Wellness Exam on??07/14/20.??Satisfied by Yoel RICARDO, FNP, Jesse  ??? ? ? ?Obesity Screening on??01/14/21.??Satisfied by Viviana Lee LPN  ?      Patient condition discussed?in detail with nurse practitioner.? Agree with plan of care?and follow-up.

## 2022-05-02 NOTE — HISTORICAL [facility] CERNER
This is a historical note converted from [employer]. Formatting and pictures may have been removed.  Please reference [employer] for original formatting and attached multimedia. Chief Complaint  NEEDS ORDER FOR DEXA SCAN, C/O SOB WITH EXCERTION, TIRED ALOT AND DOESNT WANT TO DO ANYTHING  History of Present Illness  Patients primary concern today is that she has been having some ongoing shortness of breath, the shortness of breath is specifically associated with exertion?as she is very active at home cooking cleaning?is working out in the yard is resolved with stopping for a moment in sitting trying to recover.? She has had no nausea, vomiting,?or headaches associated with this shortness of breath.? Is not associated with a cough?or allergy type symptoms.? She denies any chest pain?associated with this or arm or neck pain at this time.  Review of Systems  Constitutional:?No fever, no weakness, no weight loss, no fatigue  Eye: ? ? ? ? ? ? ? ???No eye redness, drainage, or pain  ENMT:??? ? ? ? ? ? No sore?throat pain, postnasal drainage, ?or mucus production  Respiratory:????No wheezing,?shortness of breath as above  Cardiovascular:??No chest pain, no VELA  Gastrointestinal:?No nausea, vomiting, or diarrhea. No abdominal pain, no hematochezia or melena  Musculoskeletal:?No muscle or joint pain, no joint swelling  Integumentary:??? No skin rash or abnormal lesion  Neuro:??No headaches, dizziness, or weakness  Psych:?No anxiety, depression, or SI/HI  Endo:??No polyuria, polydipsia, or polyphagia  Heme/Lymph:??No bruising or lymphadenopathy  Physical Exam  Vitals & Measurements  T:?36.8? ?C (Oral)? HR:?92(Peripheral)? BP:?168/94?  HT:?154?cm? WT:?71.6?kg? BMI:?30.19?  General: ???? ? ? ? ? Well developed, well-nourished, in no acute distress  Eye: ? ? ? ? ? ? ? ? ? ??Clear conjunctiva, eyelids normal  HENT:??? ? ? ? ? ? ? ? No erythema, No exudate or swelling, TMs clear  Neck:??? ? ? ? ? ? ? ? ?Full range of motion, No cervical  lymphadenopathy  Respiratory:??? ? ?Clear to auscultation bilaterally  Cardiovascular:?Regular rate and rhythm without murmurs, gallops or rubs  Abdomen: ? ? ? ? ?Soft, NT, ND, NABS x4, no HSM  Musculoskeletal:?No tenderness, joints WNL, FROM, neg SLR, no CCE  Neuro: ? ? ? ? ? ? ? ? ?No motor/sensory deficits, Reflexes 2+ throughout, CN II-XII intact  Integumentary: ??No rashes or skin lesions present  LN:?WNL  Assessment/Plan  1.?Benign essential HTN  ?Patient on blood pressure?is running in the 120s-130s over 80s patient is diligent with checking.  2.?SOB (shortness of breath)  ?This shortness of breath is associated with exertion.? Her O2 sat here was 96% on room air and remained relatively unchanged despite walking around the hallway.  But as this seems to be significant at home when she is a very active person?referral to cardiology?would be appropriate at this time.  3.?Hyperlipemia  ?Repeating labs will contact patient with results and forward to cardiology.   Problem List/Past Medical History  Ongoing  Arthritis  Benign essential HTN  Bulging disc  Cataracts  Chronic back pain  Depression  Hyperlipemia  Insomnia  Medicare annual wellness visit, subsequent  Obesity  Osteoarthritis  Osteoporosis  Presence of pessary  Rib fracture  Scoliosis  Sprain of acromioclavicular joint  Wears glasses  Historical  No qualifying data  Procedure/Surgical History  Discission of secondary membrane [after cataract] (01/07/2015)  Discission of secondary membranous cataract (opacified posterior lens capsule and/or anterior hyaloid); laser surgery (eg, YAG laser) (1 or more stages). (01/07/2015)  Extracapsular cataract removal with insertion of intraocular lens prosthesis (1 stage procedure), manual or mechanical technique (eg, irrigation and aspiration or phacoemulsification). (07/29/2013)  Insertion of intraocular lens prosthesis at time of cataract extraction, one-stage (07/29/2013)  Phacoemulsification and aspiration of  cataract (07/29/2013)  [facility] - Extracapsular cataract extraction (07/15/2013)  Extracapsular cataract removal with insertion of intraocular lens prosthesis (1 stage procedure), manual or mechanical technique (eg, irrigation and aspiration or phacoemulsification). (07/15/2013)  Insertion of intraocular lens prosthesis at time of cataract extraction, one-stage (07/15/2013)  Phacoemulsification and aspiration of cataract (07/15/2013)  flat foot correction (1958)  tonsillectomy (1958)  back surgery  bilateral tubal ligation   Medications  amitriptyline 50 mg oral tablet, See Instructions, 1 refills  aspirin 81 mg oral tablet, CHEWABLE, 81 mg= 1 tab(s), Oral, Daily, 11 refills  Celexa 10 mg oral tablet, 10 mg= 1 tab(s), Oral, Daily, 1 refills  gabapentin 300 mg oral capsule, 300 mg= 1 cap(s), Oral, TID, 3 refills  hydrochlorothiazide 25 mg oral tablet, See Instructions, 3 refills  Emre Red Omega-3 Krill Oil, 300 mg, Oral, Daily  meloxicam 15 mg oral tablet, See Instructions, 2 refills  Ocuvite oral tablet, Oral, Daily  omeprazole 20 mg oral DR capsule, 20 mg= 1 cap(s), Oral, Daily  traMADol 50 mg oral tablet, 50 mg= 1 tab(s), Oral, q6hr, PRN, 1 refills  Xyzal 5 mg oral tablet, 5 mg= 1 tab(s), Oral, qPM  Zaditor 0.025% ophthalmic solution, Oral, BID, PRN  Allergies  No Known Allergies  Social History  Alcohol - Denies Alcohol Use, 07/11/2013  Past, 03/09/2018  Employment/School  Retired, Highest education level: High school., 07/11/2013  Home/Environment  Lives with Spouse., 07/11/2013  Nutrition/Health  Regular, 07/21/2013  Substance Abuse - Denies Substance Abuse, 07/21/2013  Never, 01/30/2019  Tobacco - Denies Tobacco Use, 07/11/2013  Never (less than 100 in lifetime), N/A, 04/15/2019  Family History  CAD - Coronary artery disease: Sister.  Cancer: Mother and Father.  Rheumatoid arthritis: Brother.  Stroke: Brother.  Immunizations  Vaccine Date Status   pneumococcal 23-polyvalent vaccine 09/27/2018 Given   influenza  virus vaccine, inactivated 09/05/2018 Recorded   pneumococcal 13-valent conjugate vaccine 03/29/2018 Recorded   influenza virus vaccine, inactivated 08/11/2017 Recorded   Health Maintenance  Health Maintenance  ???Pending?(in the next year)  ??? ??OverDue  ??? ? ? ?Pneumococcal Vaccine due??and every?  ??? ? ? ?Advance Directive due??07/21/14??and every 1??year(s)  ??? ? ? ?Smoking Cessation due??07/29/14??and every 1??year(s)  ??? ??Due?  ??? ? ? ?ADL Screening due??04/16/19??and every 1??year(s)  ??? ? ? ?Alcohol Misuse Screening due??04/16/19??and every 1??year(s)  ??? ? ? ?Breast Cancer Screening (Senior Wellness) due??04/16/19??and every?  ??? ? ? ?Cognitive Screening due??04/16/19??and every 1??year(s)  ??? ? ? ?Fall Risk Assessment due??04/16/19??and every 1??year(s)  ??? ? ? ?Functional Assessment due??04/16/19??and every 1??year(s)  ??? ? ? ?Geriatric Depression Screening due??04/16/19??and every 1??year(s)  ??? ? ? ?Hypertension Management-Education due??04/16/19??and every 1??year(s)  ??? ? ? ?Tetanus Vaccine due??04/16/19??and every 10??year(s)  ??? ? ? ?Zoster Vaccine due??04/16/19??and every 100??year(s)  ??? ??Due In Future?  ??? ? ? ?Hypertension Management-Blood Pressure not due until??04/14/20??and every 1??year(s)  ??? ? ? ?Hypertension Management-BMP not due until??04/14/20??and every 1??year(s)  ??? ? ? ?Obesity Screening not due until??04/15/20??and every 1??year(s)  ??? ? ? ?Aspirin Therapy for CVD Prevention not due until??04/15/20??and every 1??year(s)  ???Satisfied?(in the past 1 year)  ??? ??Satisfied?  ??? ? ? ?Aspirin Therapy for CVD Prevention on??04/15/19.??Satisfied by Sam Earl MD  ??? ? ? ?Blood Pressure Screening on??04/15/19.??Satisfied by Viviana Lee LPN  ??? ? ? ?Body Mass Index Check on??04/15/19.??Satisfied by Viviana Lee LPN  ??? ? ? ?Depression Screening on??04/15/19.??Satisfied by Viviana Lee LPN  ??? ? ? ?Diabetes Screening on??04/15/19.??Satisfied by  Isrrael Navarro  ??? ? ? ?Hypertension Management-BMP on??04/15/19.??Satisfied by Isrrael Navarro  ??? ? ? ?Influenza Vaccine on??09/05/18.??Satisfied by Viviana Lee LPN  ??? ? ? ?Lipid Screening on??04/15/19.??Satisfied by Isrrael Navarro  ??? ? ? ?Obesity Screening on??04/15/19.??Satisfied by Viviana Lee LPN  ??? ? ? ?Pneumococcal Vaccine on??09/27/18.??Satisfied by Viviana Lee LPN  ?  ?

## 2022-05-02 NOTE — HISTORICAL [facility] CERNER
This is a historical note converted from [employer]. Formatting and pictures may have been removed.  Please reference [employer] for original formatting and attached multimedia. Chief Complaint  WELLNESS CPX FAST  History of Present Illness  74 year old WF presents fasting for annual wellness  PMH: HTN, Scoliosis, Chronic back pain, Insomnia, HLD, Osteoporosis  ?  , Retired  No Tob, No EtOH  No exercise  ?   with worsening dementia. He is functioning better since starting anxiety/depression med last year. His anger is much less which makes my life so much easier.  Review of Systems  Constitutional:?no fever, fatigue, weakness  Eye:?no vision loss, eye redness, drainage, or pain  ENMT:?no sore throat, ear pain, sinus pain/congestion  Respiratory:?no cough, no wheezing, no shortness of breath  Cardiovascular:?no chest pain, no palpitations, no edema  Gastrointestinal:?no nausea, vomiting, or diarrhea. No abdominal pain  Genitourinary:?no dysuria, no urinary frequency or urgency, no hematuria  Hema/Lymph:?no abnormal bruising or bleeding  Endocrine:?no heat or cold intolerance, no excessive thirst or excessive urination  Musculoskeletal:?no muscle or joint pain, no joint swelling  Integumentary:?no skin rash or abnormal lesion  Neurologic: no headache, no dizziness, no weakness or numbness  ?  Physical Exam  Vitals & Measurements  T:?36.8? ?C (Oral)? HR:?64(Peripheral)? BP:?140/84?  HT:?149?cm? WT:?71.4?kg? BMI:?32.16?  General:?well-developed well-nourished in no acute distress  Eye: PERRLA, EOMI, clear conjunctiva, eyelids normal  HENT:?TMs/ear canals clear, oropharynx without erythema/exudate, oropharynx and nasal mucosal surfaces moist, no maxillary/frontal sinus tenderness to palpation  Neck: full range of motion, no thyromegaly or lymphadenopathy  Respiratory:?clear to auscultation bilaterally  Cardiovascular:?regular rate and rhythm without murmurs, gallops or rubs  Gastrointestinal:?soft, non-tender,  non-distended with normal bowel sounds, without masses to palpation  Genitourinary: no CVA tenderness to palpation  Musculoskeletal:?full range of motion of all extremities/spine without limitation or discomfort  Integumentary: no rashes or skin lesions present  Neurologic: cranial nerves intact, no signs of peripheral neurological deficit, motor/sensory function intact  ?  Assessment/Plan  1.?Medicare annual wellness visit, subsequent?Z00.00  ?LABS: CBC, CMP, TSH, FLP  Ordered:  Medicare Annual Wellness- Subsequent  PC, Medicare annual wellness visit, subsequent, [facility] - [facility], 07/14/20 10:50:00 CDT  ?  2.?Benign essential HTN?I10  ?Continue HCTZ 25mg PO q day  Ordered:  Automated Diff, Routine collect, 07/14/20 10:42:00 CDT, Blood, Collected, Stop date 07/14/20 10:42:00 CDT, Lab Collect, Benign essential HTN, 07/14/20 10:42:00 CDT  CBC w/ Auto Diff, Routine collect, 07/14/20 10:42:00 CDT, Blood, Stop date 07/14/20 10:42:00 CDT, Lab Collect, Benign essential HTN, 07/14/20 10:42:00 CDT  Clinic Follow up, *Est. 01/14/21 3:00:00 CST, Order for future visit, Benign essential HTN, [facility]  Comprehensive Metabolic Panel, Routine collect, 07/14/20 10:42:00 CDT, Blood, Stop date 07/14/20 10:42:00 CDT, Lab Collect, Benign essential HTN  Hyperlipemia, 07/14/20 10:42:00 CDT  Office/Outpatient Visit Level 3 Established 62316 PC, Anxiety  Hyperlipemia  Osteoporosis  Scoliosis  Benign essential HTN, [facility] - [facility], 07/14/20 10:50:00 CDT  ?  3.?Anxiety?F41.9  Continue Celexa 20mg PO q day  Ordered:  Office/Outpatient Visit Level 3 Established 56320 PC, Anxiety  Hyperlipemia  Osteoporosis  Scoliosis  Benign essential HTN, [facility] - [facility], 07/14/20 10:50:00 CDT  Thyroid Stimulating Hormone, Routine collect, 07/14/20 10:42:00 CDT, Blood, Stop date 07/14/20 10:42:00 CDT, Lab Collect, Anxiety, 07/14/20 10:42:00 CDT  ?  4.?Hyperlipemia?E78.5  Ordered:  Comprehensive Metabolic Panel, Routine collect, 07/14/20 10:42:00  CDT, Blood, Stop date 07/14/20 10:42:00 CDT, Lab Collect, Benign essential HTN  Hyperlipemia, 07/14/20 10:42:00 CDT  Lipid Panel, Routine collect, 07/14/20 10:42:00 CDT, Blood, Stop date 07/14/20 10:42:00 CDT, Lab Collect, Hyperlipemia, 07/14/20 10:42:00 CDT  Office/Outpatient Visit Level 3 Established 18925 PC, Anxiety  Hyperlipemia  Osteoporosis  Scoliosis  Benign essential HTN, [facility] - [facility], 07/14/20 10:50:00 CDT  ?  5.?Osteoporosis?M81.0  Ordered:  Office/Outpatient Visit Level 3 Established 46075 PC, Anxiety  Hyperlipemia  Osteoporosis  Scoliosis  Benign essential HTN, [facility] - AFP, 07/14/20 10:50:00 CDT  ?  6.?Scoliosis?M41.9  ?with persistent back pain  ?Continue Tramadol 50mg PO TID PRN pain  Ordered:  Office/Outpatient Visit Level 3 Established 69006 PC, Anxiety  Hyperlipemia  Osteoporosis  Scoliosis  Benign essential HTN, [facility] - [facility], 07/14/20 10:50:00 CDT  ?  7.?Osteoarthritis?M19.90  ?Continue Meloxicam 15mg PO q day  ?  Referrals  Clinic Follow up, *Est. 01/14/21 3:00:00 CST, Order for future visit, Benign essential HTN, [facility]   Problem List/Past Medical History  Ongoing  Anxiety  Arthritis  Benign essential HTN  Bulging disc  Cataracts  Chronic back pain  Depression  Hyperlipemia  Insomnia  Medicare annual wellness visit, subsequent  Obesity  BARB on CPAP  Osteoarthritis  Osteoporosis  Presence of pessary  Rib fracture  Scoliosis  Sprain of acromioclavicular joint  Wears glasses  Historical  No qualifying data  Procedure/Surgical History  Discission of secondary membrane [after cataract] (01/07/2015)  Discission of secondary membranous cataract (opacified posterior lens capsule and/or anterior hyaloid); laser surgery (eg, YAG laser) (1 or more stages). (01/07/2015)  Extracapsular cataract removal with insertion of intraocular lens prosthesis (1 stage procedure), manual or mechanical technique (eg, irrigation and aspiration or phacoemulsification). (07/29/2013)  Insertion of  intraocular lens prosthesis at time of cataract extraction, one-stage (07/29/2013)  Phacoemulsification and aspiration of cataract (07/29/2013)  [facility] - Extracapsular cataract extraction (07/15/2013)  Extracapsular cataract removal with insertion of intraocular lens prosthesis (1 stage procedure), manual or mechanical technique (eg, irrigation and aspiration or phacoemulsification). (07/15/2013)  Insertion of intraocular lens prosthesis at time of cataract extraction, one-stage (07/15/2013)  Phacoemulsification and aspiration of cataract (07/15/2013)  flat foot correction (1958)  tonsillectomy (1958)  back surgery  bilateral tubal ligation   Medications  amitriptyline 50 mg oral tablet, See Instructions  aspirin 81 mg oral tablet, CHEWABLE, 81 mg= 1 tab(s), Oral, Daily, 11 refills  citalopram 20 mg oral tablet, See Instructions  fluticasone 50 mcg/inh nasal spray, 1 spray(s), Both Nostrils, Daily  gabapentin 300 mg oral capsule, See Instructions  hydrochlorothiazide 25 mg oral tablet, See Instructions  Emre Red Omega-3 Krill Oil, 300 mg, Oral, Daily  meloxicam 15 mg oral tablet, See Instructions, 2 refills  Ocuvite oral tablet, Oral, Daily  omeprazole 40 mg oral DR capsule, 40 mg= 1 cap(s), Oral, Daily  Slow Release Iron 160 mg oral tablet, extended release, 160 mg= 1 tab(s), Oral, Daily  traMADol 50 mg oral tablet, 50 mg= 1 tab(s), Oral, q4hr, PRN, 1 refills  Xyzal 5 mg oral tablet, 5 mg= 1 tab(s), Oral, qPM  Zaditor 0.025% ophthalmic solution, Oral, BID, PRN  Allergies  No Known Allergies  Social History  Abuse/Neglect  No, 07/14/2020  Alcohol - Denies Alcohol Use, 07/11/2013  Past, 03/09/2018  Employment/School  Retired, Highest education level: High school., 07/11/2013  Home/Environment  Lives with Spouse., 07/11/2013  Nutrition/Health  Regular, 07/21/2013  Substance Use - Denies Substance Abuse, 07/21/2013  Never, 01/30/2019  Tobacco - Denies Tobacco Use, 07/11/2013  Never (less than 100 in lifetime), N/A,  07/14/2020  Family History  CAD - Coronary artery disease: Sister.  Cancer: Mother and Father.  Rheumatoid arthritis: Brother.  Stroke: Brother.  Immunizations  Vaccine Date Status   influenza virus vaccine, inactivated 09/19/2019 Recorded   pneumococcal 23-polyvalent vaccine 09/27/2018 Given   influenza virus vaccine, inactivated 09/05/2018 Recorded   pneumococcal 13-valent conjugate vaccine 03/29/2018 Recorded   influenza virus vaccine, inactivated 08/11/2017 Recorded   influenza virus vaccine, inactivated 09/30/2014 Recorded   influenza virus vaccine, inactivated 10/08/2010 Recorded   influenza virus vaccine, inactivated 11/21/2001 Recorded   Health Maintenance  Health Maintenance  ???Pending?(in the next year)  ??? ??OverDue  ??? ? ? ?Pneumococcal Vaccine due??and every?  ??? ? ? ?Cognitive Screening due??01/01/20??and every 1??year(s)  ??? ? ? ?Fall Risk Assessment due??01/01/20??and every 1??year(s)  ??? ? ? ?Functional Assessment due??01/01/20??and every 1??year(s)  ??? ? ? ?Aspirin Therapy for CVD Prevention due??04/15/20??and every 1??year(s)  ??? ??Due?  ??? ? ? ?ADL Screening due??07/14/20??and every 1??year(s)  ??? ? ? ?Breast Cancer Screening due??07/14/20??and every?  ??? ? ? ?Hypertension Management-Education due??07/14/20??and every 1??year(s)  ??? ? ? ?Influenza Vaccine due??07/14/20??and every?  ??? ? ? ?Tetanus Vaccine due??07/14/20??and every 10??year(s)  ??? ? ? ?Zoster Vaccine due??07/14/20??and every?  ??? ??Due In Future?  ??? ? ? ?Hypertension Management-BMP not due until??10/29/20??and every 1??year(s)  ??? ? ? ?Advance Directive not due until??01/01/21??and every 1??year(s)  ??? ? ? ?Alcohol Misuse Screening not due until??01/01/21??and every 1??year(s)  ??? ? ? ?Obesity Screening not due until??01/01/21??and every 1??year(s)  ???Satisfied?(in the past 1 year)  ??? ??Satisfied?  ??? ? ? ?Blood Pressure Screening on??07/14/20.??Satisfied by Viviana Lee LPN  ??? ? ? ?Body Mass Index  Check on??07/14/20.??Satisfied by Viviana Lee LPN  ??? ? ? ?Depression Screening on??07/14/20.??Satisfied by Viviana Lee LPN  ??? ? ? ?Diabetes Screening on??10/30/19.??Satisfied by Isrrael Navarro  ??? ? ? ?Hypertension Management-Blood Pressure on??07/14/20.??Satisfied by Viviana Lee LPN  ??? ? ? ?Hypertension Management-BMP on??10/30/19.??Satisfied by Isrrael Navarro  ??? ? ? ?Influenza Vaccine on??09/19/19.??Satisfied by Kylah Lopez CMA  ??? ? ? ?Lipid Screening on??10/30/19.??Satisfied by Isrrael Navarro  ??? ? ? ?Medicare Annual Wellness Exam on??07/14/20.??Satisfied by Yoel RICARDO, ASHKAN, Jesse  ??? ? ? ?Obesity Screening on??07/14/20.??Satisfied by Viviana Lee LPN  ?      Patient condition discussed?in detail with nurse practitioner.? Agree with plan of care?and follow-up.

## 2022-05-02 NOTE — HISTORICAL [facility] CERNER
This is a historical note converted from [employer]. Formatting and pictures may have been removed.  Please reference [employer] for original formatting and attached multimedia. Chief Complaint   WELLNESS CPX FAST  History of Present Illness  76 year old WF presents fasting for annual wellness  PMH: HTN, Scoliosis, Chronic back pain, Insomnia, HLD, Osteoporosis  ?   , Retired  No Tob, No EtOH  No exercise  ?   Pt is without acute complaints.?  has Alzheimers disease- Pt is primary caregiver and is beginning to experience caregiver burnout.? Her  requiring 24hr care/ supervision.? Considering Nursing home placement, sitter services etc.  ?   Colonoscopy 2/19- Dr. Swan  MMG- 2/21; PAP 8/21  Review of Systems  Constitutional:?no fever, no fatigue, no weakness  Psych: no anxiety,?no?depression, no insomnia  Eye:?no vision loss, no eye redness, no drainage, or pain  ENMT:?no sore throat, no ear pain, no sinus pain/congestion  Respiratory:?no cough, no wheezing, no shortness of breath  Cardiovascular:?no chest pain, no palpitations, no edema  Gastrointestinal:?no abdominal pain, no nausea, vomiting, diarrhea or constipation  Genitourinary:?no urinary frequency,? urgency, or incontinence, no dysuria, no hematuria  Hema/Lymph:?no abnormal bruising or bleeding  Endocrine:?no heat or cold intolerance, no excessive thirst or excessive urination  Musculoskeletal:?no muscle or joint pain, no joint swelling  Integumentary:?no skin rash or abnormal lesion  Neurologic: no headache, no dizziness, no weakness or numbness  ?  Physical Exam  Vitals & Measurements  T:?36.8? ?C (Oral)? HR:?64(Peripheral)? BP:?142/72?  HT:?149.00?cm? HT:?149?cm? WT:?71.500?kg? WT:?71.5?kg? BMI:?32.21?  General:?well-developed well-nourished in no acute distress  Eye: PERRLA, EOMI, clear conjunctiva, eyelids normal  HENT:?TMs/ear canals clear, oropharynx without erythema/exudate, oropharynx and nasal mucosal surfaces moist, no  maxillary/frontal sinus tenderness to palpation  Neck: full range of motion, no thyromegaly, no?lymphadenopathy, no carotid bruits  Respiratory:?respirations even and unlabored, clear to auscultation bilaterally  Cardiovascular:?regular rate and rhythm without murmurs, gallops or rubs  Gastrointestinal:?soft, non-tender, non-distended with normal bowel sounds, no palpable masses  Genitourinary: no CVA tenderness  Musculoskeletal:?full range of motion of all extremities/spine without limitation or discomfort  Integumentary: no rashes or skin lesions present  Neurologic: cranial nerves intact, no signs of peripheral neurological deficit, motor/sensory function intact  ?  Assessment/Plan  1.?Medicare annual wellness visit, subsequent?Z00.00  ?Fasting?labs completed (CBC, CMP, TSH, FLP); will call patient with results.?  ?   Lengthy discussion with patient regarding her husbands?advancing disease in?his care?as he continues to decline.  ?   ?Immunizations and age appropriate preventative exams discussed.  ?  2.?Benign essential HTN?I10  ?Continue?HCTZ 25 mg daily.  Ordered:  Comprehensive Metabolic Panel, Routine collect, 09/16/21 14:17:00 CDT, Blood, Stop date 09/16/21 14:17:00 CDT, Lab Collect, Benign essential HTN, 09/16/21 14:17:00 CDT  ?  3.?GERD (gastroesophageal reflux disease)?K21.9  ?Continue?omeprazole 40 mg daily.  ?  4.?Anxiety?F41.9  ?Continue amitriptyline 50 mg at bedtime,?citalopram 20 mg daily.  ?  Orders:  Lipid Panel, Routine collect, 09/16/21 14:17:00 CDT, Blood, Stop date 09/16/21 14:17:00 CDT, Lab Collect, Hyperlipemia, 09/16/21 14:17:00 CDT  Thyroid Stimulating Hormone, Routine collect, 09/16/21 14:17:00 CDT, Blood, Stop date 09/16/21 14:17:00 CDT, Lab Collect, Fatigue, 09/16/21 14:17:00 CDT  Vitamin D, 25-Hydroxy Level, Routine collect, 09/16/21 14:17:00 CDT, Blood, Stop date 09/16/21 14:17:00 CDT, Lab Collect, Vitamin D deficiency, 09/16/21 14:17:00 CDT  Follow-up in 6 months, sooner if  needed.  Referrals  Clinic Follow up, Order for future visit   Problem List/Past Medical History  Ongoing  Anxiety  Arthritis  Benign essential HTN  Bulging disc  Chronic back pain  Depression  GERD (gastroesophageal reflux disease)  Hyperlipemia  Insomnia  Medicare annual wellness visit, subsequent  Obesity  BARB on CPAP  Osteoarthritis  Osteoporosis  Presence of pessary  Rib fracture  Scoliosis  Sprain of acromioclavicular joint  Vitamin D deficiency  Wears glasses  Historical  Cataracts  Procedure/Surgical History  Colonoscopy (02/26/2019)  Discission of secondary membrane [after cataract] (01/07/2015)  Discission of secondary membranous cataract (opacified posterior lens capsule and/or anterior hyaloid); laser surgery (eg, YAG laser) (1 or more stages). (01/07/2015)  Colonoscopy (09/04/2013)  Extracapsular cataract removal with insertion of intraocular lens prosthesis (1 stage procedure), manual or mechanical technique (eg, irrigation and aspiration or phacoemulsification). (07/29/2013)  Insertion of intraocular lens prosthesis at time of cataract extraction, one-stage (07/29/2013)  Phacoemulsification and aspiration of cataract (07/29/2013)  [facility] - Extracapsular cataract extraction (07/15/2013)  Extracapsular cataract removal with insertion of intraocular lens prosthesis (1 stage procedure), manual or mechanical technique (eg, irrigation and aspiration or phacoemulsification). (07/15/2013)  Insertion of intraocular lens prosthesis at time of cataract extraction, one-stage (07/15/2013)  Phacoemulsification and aspiration of cataract (07/15/2013)  flat foot correction (1958)  tonsillectomy (1958)  back surgery  bilateral tubal ligation   Medications  amitriptyline 50 mg oral tablet, See Instructions  aspirin 81 mg oral tablet, CHEWABLE, 81 mg= 1 tab(s), Oral, Daily, 11 refills  citalopram 20 mg oral tablet, See Instructions  fluticasone 50 mcg/inh nasal spray, 1 spray(s), Both Nostrils, Daily  gabapentin 300 mg  oral capsule, See Instructions  hydrochlorothiazide 25 mg oral tablet, See Instructions  Emre Red Omega-3 Krill Oil, 300 mg, Oral, Daily  meloxicam 15 mg oral tablet, See Instructions, 2 refills  Ocuvite oral tablet, Oral, Daily  omeprazole 40 mg oral DR capsule, 40 mg= 1 cap(s), Oral, Daily  Slow Release Iron 160 mg oral tablet, extended release, 160 mg= 1 tab(s), Oral, Daily  traMADol 50 mg oral tablet, 50 mg= 1 tab(s), Oral, q4hr, PRN, 1 refills  Xyzal 5 mg oral tablet, 5 mg= 1 tab(s), Oral, qPM  Zaditor 0.025% ophthalmic solution, Oral, BID, PRN  Allergies  No Known Allergies  Social History  Abuse/Neglect  No, 09/16/2021  Alcohol - Denies Alcohol Use, 07/11/2013  Past, 03/09/2018  Employment/School  Retired, Highest education level: High school., 07/11/2013  Home/Environment  Lives with Spouse., 07/11/2013  Nutrition/Health  Regular, 07/21/2013  Substance Use - Denies Substance Abuse, 07/21/2013  Never, 01/30/2019  Tobacco - Denies Tobacco Use, 07/11/2013  Never (less than 100 in lifetime), N/A, 09/16/2021  Family History  CAD - Coronary artery disease: Sister.  Cancer: Mother and Father.  Rheumatoid arthritis: Brother.  Stroke: Brother.  Immunizations  Vaccine Date Status   influenza virus vaccine, inactivated 09/13/2021 Given   tetanus/diphtheria/pertussis, acel(Tdap) 03/19/2021 Recorded   COVID-19 MRNA, LNP-S, PF- Pfizer 03/04/2021 Recorded   COVID-19 MRNA, LNP-S, PF- Pfizer 02/11/2021 Recorded   influenza virus vaccine, inactivated 09/10/2020 Given   influenza virus vaccine, inactivated 09/19/2019 Recorded   zoster vaccine, inactivated 10/15/2018 Recorded   pneumococcal 23-polyvalent vaccine 09/27/2018 Given   influenza virus vaccine, inactivated 09/05/2018 Recorded   zoster vaccine, inactivated 07/31/2018 Recorded   pneumococcal 13-valent conjugate vaccine 03/29/2018 Recorded   influenza virus vaccine, inactivated 08/11/2017 Recorded   influenza virus vaccine, inactivated 09/30/2014 Recorded   influenza  virus vaccine, inactivated 10/08/2010 Recorded   influenza virus vaccine, inactivated 11/21/2001 Recorded   Health Maintenance  Health Maintenance  ???Pending?(in the next year)  ??? ??OverDue  ??? ? ? ?Aspirin Therapy for CVD Prevention due??04/15/20??and every 1??year(s)  ??? ? ? ?Advance Directive due??01/02/21??and every 1??year(s)  ??? ? ? ?Cognitive Screening due??01/02/21??and every 1??year(s)  ??? ? ? ?Fall Risk Assessment due??01/02/21??and every 1??year(s)  ??? ? ? ?Functional Assessment due??01/02/21??and every 1??year(s)  ??? ? ? ?Medicare Annual Wellness Exam due??07/14/21??and every 1??year(s)  ??? ??Due?  ??? ? ? ?ADL Screening due??09/16/21??and every 1??year(s)  ??? ? ? ?Hypertension Management-Education due??09/16/21??and every 1??year(s)  ??? ??Due In Future?  ??? ? ? ?Obesity Screening not due until??01/01/22??and every 1??year(s)  ??? ? ? ?Hypertension Management-BMP not due until??01/14/22??and every 1??year(s)  ???Satisfied?(in the past 1 year)  ??? ??Satisfied?  ??? ? ? ?Blood Pressure Screening on??09/16/21.??Satisfied by Viviana Lee LPN  ??? ? ? ?Body Mass Index Check on??09/16/21.??Satisfied by Viviana Lee LPN  ??? ? ? ?Depression Screening on??09/16/21.??Satisfied by Viviana Lee LPN  ??? ? ? ?Diabetes Screening on??01/14/21.??Satisfied by Isrrael Navarro  ??? ? ? ?Hypertension Management-Blood Pressure on??09/16/21.??Satisfied by Viviana Lee LPN  ??? ? ? ?Influenza Vaccine on??09/13/21.??Satisfied by Viviana Lee LPN  ??? ? ? ?Lipid Screening on??01/14/21.??Satisfied by Isrrael Navarro  ??? ? ? ?Obesity Screening on??09/16/21.??Satisfied by Viviana Lee LPN  ??? ? ? ?Tetanus Vaccine on??03/19/21.??Satisfied by Viviana Lee LPN  ?      Patient condition discussed?in detail with nurse practitioner.? Agree with plan of care?and follow-up.

## 2022-05-02 NOTE — HISTORICAL [facility] CERNER
This is a historical note converted from [employer]. Formatting and pictures may have been removed.  Please reference [employer] for original formatting and attached multimedia. Chief Complaint  3 [facility]  History of Present Illness  Patient presents today for follow-up of her high blood pressure and elevated cholesterol.? She has been taking her medications consistently?she tries to watch her diet although she could do better.? She is also being followed for anemia with low iron? by .  And by sleep specialist for her obstructive sleep apnea.  Review of Systems  Constitutional:??? ?No fever, No weakness?  Respiratory:??? ? ? ?No wheezing,?No shortness of breath  Cardiovascular: ??No chest pain  Gastrointestinal:??No nausea, vomiting, or diarrhea. No abdominal pain  Musculoskeletal:??No muscle or joint pain, No joint swelling  Integumentary:???? No skin rash or abnormal lesion  ?  Physical Exam  Vitals & Measurements  T:?36.8? ?C (Oral)? HR:?76(Peripheral)? BP:?140/86?  HT:?149?cm? WT:?69.8?kg? BMI:?31.44?  General: ? ? ? ? ? ???Well developed, well-nourished, in no acute distress  Neck: ? ? ? ? ? ? ? ? ??Full range of motion, anterior cervical lymphadenopathy  Respiratory: ? ? ??Clear to auscultation bilaterally  Cardiovascular:?Regular rate and rhythm without murmurs, gallops or rubs  Integumentary:??No rashes or skin lesions present  ?  Assessment/Plan  1.?Hyperlipemia?E78.5  ?Repeat labs today  Will contact patient with lab results.  Ordered:  Comprehensive Metabolic Panel, Routine collect, 10/30/19 10:20:00 CDT, Blood, Order for future visit, Stop date 10/30/19 10:20:00 CDT, Lab Collect, Hyperlipemia  Benign essential HTN, 10/30/19 10:20:00 CDT  Lipid Panel, Now collect, 10/30/19 10:20:00 CDT, Blood, Order for future visit, Stop date 10/30/19 10:20:00 CDT, Lab Collect, Hyperlipemia  Benign essential HTN, 10/30/19 10:20:00 CDT  Office/Outpatient Visit Level 3 Established [id number] PC, Hyperlipemia  Benign  essential HTN, [facility] - AFP, 10/30/19 10:20:00 CDT  ?  2.?Benign essential HTN?I10  ?Stable  Ordered:  Comprehensive Metabolic Panel, Routine collect, 10/30/19 10:20:00 CDT, Blood, Order for future visit, Stop date 10/30/19 10:20:00 CDT, Lab Collect, Hyperlipemia  Benign essential HTN, 10/30/19 10:20:00 CDT  Lipid Panel, Now collect, 10/30/19 10:20:00 CDT, Blood, Order for future visit, Stop date 10/30/19 10:20:00 CDT, Lab Collect, Hyperlipemia  Benign essential HTN, 10/30/19 10:20:00 CDT  Office/Outpatient Visit Level 3 Established [id number] PC, Hyperlipemia  Benign essential HTN, [facility] AFP, 10/30/19 10:20:00 CDT  ?  Orders:  Lab Collection Request, 10/30/19 10:20:00 CDT, [facility] - AFP, 10/30/19 10:20:00 CDT   Problem List/Past Medical History  Ongoing  Anxiety  Arthritis  Benign essential HTN  Bulging disc  Cataracts  Chronic back pain  Depression  Hyperlipemia  Insomnia  Medicare annual wellness visit, subsequent  Obesity  BARB on CPAP  Osteoarthritis  Osteoporosis  Presence of pessary  Rib fracture  Scoliosis  Sprain of acromioclavicular joint  Wears glasses  Historical  No qualifying data  Procedure/Surgical History  Discission of secondary membrane [after cataract] (01/07/2015)  Discission of secondary membranous cataract (opacified posterior lens capsule and/or anterior hyaloid); laser surgery (eg, YAG laser) (1 or more stages). (01/07/2015)  Extracapsular cataract removal with insertion of intraocular lens prosthesis (1 stage procedure), manual or mechanical technique (eg, irrigation and aspiration or phacoemulsification). (07/29/2013)  Insertion of intraocular lens prosthesis at time of cataract extraction, one-stage (07/29/2013)  Phacoemulsification and aspiration of cataract (07/29/2013)  [facility] - Extracapsular cataract extraction (07/15/2013)  Extracapsular cataract removal with insertion of intraocular lens prosthesis (1 stage procedure), manual or mechanical technique (eg, irrigation and  aspiration or phacoemulsification). (07/15/2013)  Insertion of intraocular lens prosthesis at time of cataract extraction, one-stage (07/15/2013)  Phacoemulsification and aspiration of cataract (07/15/2013)  flat foot correction (1958)  tonsillectomy (1958)  back surgery  bilateral tubal ligation   Medications  amitriptyline 50 mg oral tablet, See Instructions, 1 refills  aspirin 81 mg oral tablet, CHEWABLE, 81 mg= 1 tab(s), Oral, Daily, 11 refills  citalopram 10 mg oral tablet, See Instructions, 1 refills  gabapentin 300 mg oral capsule, 300 mg= 1 cap(s), Oral, TID, 3 refills  hydrochlorothiazide 25 mg oral tablet, See Instructions, 3 refills  Emre Red Omega-3 Krill Oil, 300 mg, Oral, Daily  meloxicam 15 mg oral tablet, See Instructions, 2 refills  Ocuvite oral tablet, Oral, Daily  Slow Release Iron 160 mg oral tablet, extended release, 160 mg= 1 tab(s), Oral, Daily  traMADol 50 mg oral tablet, 50 mg= 1 tab(s), Oral, q6hr, PRN, 1 refills  Xyzal 5 mg oral tablet, 5 mg= 1 tab(s), Oral, qPM  Zaditor 0.025% ophthalmic solution, Oral, BID, PRN  Allergies  No Known Allergies  Social History  Abuse/Neglect  No, 10/30/2019  Alcohol - Denies Alcohol Use, 07/11/2013  Past, 03/09/2018  Employment/School  Retired, Highest education level: High school., 07/11/2013  Home/Environment  Lives with Spouse., 07/11/2013  Nutrition/Health  Regular, 07/21/2013  Substance Use - Denies Substance Abuse, 07/21/2013  Never, 01/30/2019  Tobacco - Denies Tobacco Use, 07/11/2013  Never (less than 100 in lifetime), N/A, 10/30/2019  Family History  CAD - Coronary artery disease: Sister.  Cancer: Mother and Father.  Rheumatoid arthritis: Brother.  Stroke: Brother.  Immunizations  Vaccine Date Status   influenza virus vaccine, inactivated 09/19/2019 Recorded   pneumococcal 23-polyvalent vaccine 09/27/2018 Given   influenza virus vaccine, inactivated 09/05/2018 Recorded   pneumococcal 13-valent conjugate vaccine 03/29/2018 Recorded   influenza virus  vaccine, inactivated 08/11/2017 Recorded   influenza virus vaccine, inactivated 09/30/2014 Recorded   influenza virus vaccine, inactivated 10/08/2010 Recorded   influenza virus vaccine, inactivated 11/21/2001 Recorded   Health Maintenance  Health Maintenance  ???Pending?(in the next year)  ??? ??OverDue  ??? ? ? ?Pneumococcal Vaccine due??and every?  ??? ? ? ?Advance Directive due??01/01/19??and every 1??year(s)  ??? ? ? ?Alcohol Misuse Screening due??01/01/19??and every 1??year(s)  ??? ? ? ?Cognitive Screening due??01/01/19??and every 1??year(s)  ??? ? ? ?Fall Risk Assessment due??01/01/19??and every 1??year(s)  ??? ? ? ?Functional Assessment due??01/01/19??and every 1??year(s)  ??? ? ? ?Geriatric Depression Screening due??01/01/19??and every 1??year(s)  ??? ??Due?  ??? ? ? ?ADL Screening due??10/30/19??and every 1??year(s)  ??? ? ? ?Breast Cancer Screening (Senior Wellness) due??10/30/19??and every?  ??? ? ? ?Hypertension Management-Education due??10/30/19??and every 1??year(s)  ??? ? ? ?Tetanus Vaccine due??10/30/19??and every 10??year(s)  ??? ? ? ?Zoster Vaccine due??10/30/19??and every 100??year(s)  ??? ??Due In Future?  ??? ? ? ?Obesity Screening not due until??01/01/20??and every 1??year(s)  ??? ? ? ?Hypertension Management-BMP not due until??04/14/20??and every 1??year(s)  ??? ? ? ?Aspirin Therapy for CVD Prevention not due until??04/15/20??and every 1??year(s)  ??? ? ? ?Hypertension Management-Blood Pressure not due until??10/29/20??and every 1??year(s)  ???Satisfied?(in the past 1 year)  ??? ??Satisfied?  ??? ? ? ?Aspirin Therapy for CVD Prevention on??04/15/19.??Satisfied by Sam Earl MD  ??? ? ? ?Blood Pressure Screening on??10/30/19.??Satisfied by Viviana Lee LPN  ??? ? ? ?Body Mass Index Check on??10/30/19.??Satisfied by Viviana Lee LPN  ??? ? ? ?Depression Screening on??10/30/19.??Satisfied by Viviana Lee LPN  ??? ? ? ?Diabetes Screening on??04/15/19.??Satisfied by Ramon  Isrrael  ??? ? ? ?Hypertension Management-BMP on??04/15/19.??Satisfied by Isrrael Navarro  ??? ? ? ?Hypertension Management-Blood Pressure on??10/30/19.??Satisfied by Viviana Lee LPN  ??? ? ? ?Influenza Vaccine on??09/19/19.??Satisfied by Kylah Lopez CMA  ??? ? ? ?Lipid Screening on??04/15/19.??Satisfied by Isrrael Navarro  ??? ? ? ?Obesity Screening on??10/30/19.??Satisfied by Viviana Lee LPN  ?

## 2022-05-02 NOTE — HISTORICAL [facility] CERNER
This is a historical note converted from [employer]. Formatting and pictures may have been removed.  Please reference [employer] for original formatting and attached multimedia. Chief Complaint  2-3 MONTHS C/O ALOT OF THICK MUCOUS BACK OF THROAT MOSTLY AT NIGHT WHEN SLEEPING CAUSES HER TO WHEEZE TRYING XYZAL NOW NO HELP. HAS NO ENERGY TO DO ANYTHING  History of Present Illness  Patient complains of SOB and Cough that only occurs while lying supine.?Denies any fever, productive cough, or wheezing.  ?  Review of Systems  Constitutional:?nofever, no weakness?  Eye:?no eye redness, drainage, or pain, no visual changes  ENT:nosore throat,no?nasal congestion?  Respiratory:?nowheezing,?noshortness of breath,nocough  Cardiovascular:?no chest pain  Neuro: no headaches, numbness, tingling, weakness, or dizziness  ?  ?  Physical Exam  Vitals & Measurements  T:?35.9? ?C (Axillary)? HR:?88(Peripheral)? BP:?126/86?  HT:?154?cm? HT:?154?cm? WT:?67.7?kg? WT:?67.7?kg? BMI:?28.55?  General:?Well developed, well-nourished, in no acute distress  Eye:?clear conjunctiva, eyelids normal  HENT:?noerythema,?noexudate,no swelling, TMs clear  Neck:?full range of motion,?nocervical lymphadenopathy  Respiratory:?clear to auscultation bilaterally  Cardiovascular:?regular rate and rhythm without murmurs, gallops or rubs  ?  ?  Assessment/Plan  1.?Cough  ?I feel that cough/SOB that occurs while patient is lying supine is secondary to worsening scoliosis  Patient met with Dr Muñoz about 1 year ago. Advised orthotic brace would not benefit her at this time  Offered a referral to a spine/scoliosis specialist in . Patient will discuss with?her  and let us know if she desires referral.  Ordered:  Office/Outpatient Visit Level 3 Established [id number] PC, Cough, [facility], 09/27/18 16:00:00 CDT  XR Chest 2 Views, Routine, 09/27/18 16:00:00 CDT, Other (please specify), None, Wheelchair, Patient Has IV?, Rad Type, Cough, [facility],  09/27/18 16:00:00 CDT  ?  Orders:  Clinic Follow-up PRN, 09/27/18 16:00:00 CDT, [facility], Future Order   Problem List/Past Medical History  Ongoing  Arthritis  Benign essential HTN  Bulging disc  Cataracts  Chronic back pain  Hyperlipemia  Insomnia  Osteoarthritis  Osteoporosis  Presence of pessary  Rib fracture  Scoliosis  Wears glasses  Historical  No qualifying data  Procedure/Surgical History  Discission of secondary membrane [after cataract] (01/07/2015)  Discission of secondary membranous cataract (opacified posterior lens capsule and/or anterior hyaloid); laser surgery (eg, YAG laser) (1 or more stages). (01/07/2015)  Extracapsular cataract removal with insertion of intraocular lens prosthesis (1 stage procedure), manual or mechanical technique (eg, irrigation and aspiration or phacoemulsification). (07/29/2013)  Insertion of intraocular lens prosthesis at time of cataract extraction, one-stage (07/29/2013)  Phacoemulsification and aspiration of cataract (07/29/2013)  [facility] - Extracapsular cataract extraction (07/15/2013)  Extracapsular cataract removal with insertion of intraocular lens prosthesis (1 stage procedure), manual or mechanical technique (eg, irrigation and aspiration or phacoemulsification). (07/15/2013)  Insertion of intraocular lens prosthesis at time of cataract extraction, one-stage (07/15/2013)  Phacoemulsification and aspiration of cataract (07/15/2013)  flat foot correction (1958)  tonsillectomy (1958)  back surgery  bilateral tubal ligation   Medications  amitriptyline 50 mg oral tablet, See Instructions, 1 refills  aspirin 81 mg oral tablet, CHEWABLE, Oral, Daily  gabapentin 300 mg oral capsule, See Instructions, 3 refills  hydrochlorothiazide 25 mg oral tablet, See Instructions, 3 refills  Emre Red Omega-3 Krill Oil, 300 mg, Oral, Daily  meloxicam 15 mg oral tablet, 15 mg= 1 tab(s), Oral, Daily, 1 refills  Ocuvite oral tablet, Oral, Daily  traMADol 50 mg oral tablet, 50 mg= 1  tab(s), Oral, q6hr, PRN  Xyzal 5 mg oral tablet, 5 mg= 1 tab(s), Oral, qPM  Zaditor 0.025% ophthalmic solution, Oral, BID, PRN  Allergies  No Known Allergies  Social History  Alcohol - Denies Alcohol Use, 07/11/2013  Past, 03/09/2018  Employment/School  Retired, Highest education level: High school., 07/11/2013  Home/Environment  Lives with Spouse., 07/11/2013  Nutrition/Health  Regular, 07/21/2013  Substance Abuse - Denies Substance Abuse, 07/21/2013  Tobacco - Denies Tobacco Use, 07/11/2013  Never smoker, 03/09/2018  Family History  CAD - Coronary artery disease: Sister.  Cancer: Mother and Father.  Rheumatoid arthritis: Brother.  Stroke: Brother.  Immunizations  Vaccine Date Status   pneumococcal 23-polyvalent vaccine 09/27/2018 Given   influenza virus vaccine, inactivated 09/05/2018 Recorded   pneumococcal 13-valent conjugate vaccine 03/29/2018 Recorded   influenza virus vaccine, inactivated 08/11/2017 Recorded   Health Maintenance  Health Maintenance  ???Pending?(in the next year)  ??? ??OverDue  ??? ? ? ?Pneumococcal Vaccine due??and every?  ??? ? ? ?Hypertension Management-BMP due??07/08/14??and every 1??year(s)  ??? ? ? ?Aspirin Therapy for CVD Prevention due??07/11/14??and every 1??year(s)  ??? ? ? ?Advance Directive due??07/21/14??and every 1??year(s)  ??? ? ? ?Smoking Cessation due??07/29/14??and every 1??year(s)  ??? ??Due?  ??? ? ? ?ADL Screening due??09/27/18??and every 1??year(s)  ??? ? ? ?Alcohol Misuse Screening due??09/27/18??and every 1??year(s)  ??? ? ? ?Breast Cancer Screening (Senior Wellness) due??09/27/18??and every?  ??? ? ? ?Cognitive Screening due??09/27/18??and every 1??year(s)  ??? ? ? ?Fall Risk Assessment due??09/27/18??and every 1??year(s)  ??? ? ? ?Functional Assessment due??09/27/18??and every 1??year(s)  ??? ? ? ?Geriatric Depression Screening due??09/27/18??and every 1??year(s)  ??? ? ? ?Hypertension Management-Education due??09/27/18??and every 1??year(s)  ??? ? ? ?Tetanus  Vaccine due??09/27/18??and every 10??year(s)  ??? ? ? ?Zoster Vaccine due??09/27/18??and every 100??year(s)  ???Satisfied?(in the past 1 year)  ??? ??Satisfied?  ??? ? ? ?Blood Pressure Screening on??09/27/18.??Satisfied by Viviana Lee  ??? ? ? ?Body Mass Index Check on??09/27/18.??Satisfied by Viviana Lee  ??? ? ? ?Hypertension Management-Blood Pressure on??09/27/18.??Satisfied by Viviana Lee  ??? ? ? ?Influenza Vaccine on??09/05/18.??Satisfied by Viviana Lee  ??? ? ? ?Obesity Screening on??09/27/18.??Satisfied by Viviana Lee  ??? ? ? ?Pneumococcal Vaccine on??09/27/18.??Satisfied by Viviana Lee  ?  ?

## 2022-09-19 PROBLEM — M41.9 SCOLIOSIS: Status: ACTIVE | Noted: 2022-09-19

## 2022-09-19 PROBLEM — E55.9 VITAMIN D DEFICIENCY: Status: ACTIVE | Noted: 2022-09-19

## 2022-09-19 PROBLEM — Z00.00 MEDICARE ANNUAL WELLNESS VISIT, SUBSEQUENT: Status: ACTIVE | Noted: 2022-09-19

## 2022-09-19 PROBLEM — F41.1 GENERALIZED ANXIETY DISORDER: Status: ACTIVE | Noted: 2022-09-19

## 2022-09-19 PROBLEM — I10 BENIGN ESSENTIAL HYPERTENSION: Status: ACTIVE | Noted: 2022-09-19

## 2022-09-19 PROBLEM — G89.29 CHRONIC BACK PAIN: Status: ACTIVE | Noted: 2022-09-19

## 2022-09-19 PROBLEM — M19.90 OSTEOARTHRITIS: Status: ACTIVE | Noted: 2022-09-19

## 2022-09-19 PROBLEM — E66.9 OBESITY: Status: ACTIVE | Noted: 2022-09-19

## 2022-09-19 PROBLEM — G47.00 INSOMNIA: Status: ACTIVE | Noted: 2022-09-19

## 2022-09-19 PROBLEM — K21.9 GASTROESOPHAGEAL REFLUX DISEASE: Status: ACTIVE | Noted: 2022-09-19

## 2022-09-19 PROBLEM — M81.0 OSTEOPOROSIS: Status: ACTIVE | Noted: 2022-09-19

## 2022-09-19 PROBLEM — Z96.0 VAGINAL PESSARY IN SITU: Status: ACTIVE | Noted: 2022-09-19

## 2022-09-19 PROBLEM — E78.5 HYPERLIPIDEMIA: Status: ACTIVE | Noted: 2022-09-19

## 2022-09-19 PROBLEM — M54.9 CHRONIC BACK PAIN: Status: ACTIVE | Noted: 2022-09-19

## 2022-09-19 PROBLEM — F32.A DEPRESSIVE DISORDER: Status: ACTIVE | Noted: 2022-09-19

## 2022-10-07 PROCEDURE — 87088 URINE BACTERIA CULTURE: CPT | Performed by: NURSE PRACTITIONER

## 2022-12-19 PROBLEM — Z00.00 MEDICARE ANNUAL WELLNESS VISIT, SUBSEQUENT: Status: RESOLVED | Noted: 2022-09-19 | Resolved: 2022-12-19

## 2024-02-14 PROBLEM — E78.5 HYPERLIPIDEMIA: Chronic | Status: ACTIVE | Noted: 2022-09-19

## 2024-02-14 PROBLEM — I10 BENIGN ESSENTIAL HYPERTENSION: Chronic | Status: ACTIVE | Noted: 2022-09-19

## 2025-03-27 PROCEDURE — 82607 VITAMIN B-12: CPT | Performed by: STUDENT IN AN ORGANIZED HEALTH CARE EDUCATION/TRAINING PROGRAM
